# Patient Record
Sex: MALE | Race: WHITE | NOT HISPANIC OR LATINO | Employment: FULL TIME | URBAN - METROPOLITAN AREA
[De-identification: names, ages, dates, MRNs, and addresses within clinical notes are randomized per-mention and may not be internally consistent; named-entity substitution may affect disease eponyms.]

---

## 2018-01-04 LAB
INFLUENZA A ANTIGEN, POC: NEGATIVE
INFLUENZA B ANTIGEN, POC: NEGATIVE
RAPID GROUP A STREP (OHS): POSITIVE

## 2022-02-20 ENCOUNTER — HISTORICAL (OUTPATIENT)
Dept: ADMINISTRATIVE | Facility: HOSPITAL | Age: 57
End: 2022-02-20

## 2022-02-20 ENCOUNTER — HOSPITAL ENCOUNTER (OUTPATIENT)
Dept: MEDSURG UNIT | Facility: HOSPITAL | Age: 57
End: 2022-02-23
Attending: STUDENT IN AN ORGANIZED HEALTH CARE EDUCATION/TRAINING PROGRAM | Admitting: STUDENT IN AN ORGANIZED HEALTH CARE EDUCATION/TRAINING PROGRAM

## 2022-02-20 LAB
ABS NEUT (OLG): 3.24 (ref 2.1–9.2)
ALBUMIN SERPL-MCNC: 3.3 G/DL (ref 3.5–5)
ALBUMIN/GLOB SERPL: 1 {RATIO} (ref 1.1–2)
ALP SERPL-CCNC: 59 U/L (ref 40–150)
ALT SERPL-CCNC: 20 U/L (ref 0–55)
AMPHET UR QL SCN: NEGATIVE
APPEARANCE, UA: CLEAR
APTT PPP: 31 S (ref 23.3–37)
AST SERPL-CCNC: 24 U/L (ref 5–34)
BACTERIA SPEC CULT: NORMAL
BARBITURATE SCN PRESENT UR: NEGATIVE
BASOPHILS # BLD AUTO: 0 10*3/UL (ref 0–0.2)
BASOPHILS NFR BLD AUTO: 0 %
BENZODIAZ UR QL SCN: POSITIVE
BILIRUB SERPL-MCNC: 0.7 MG/DL
BILIRUB UR QL STRIP: NEGATIVE
BILIRUBIN DIRECT+TOT PNL SERPL-MCNC: 0.3 (ref 0–0.5)
BILIRUBIN DIRECT+TOT PNL SERPL-MCNC: 0.4 (ref 0–0.8)
BUN SERPL-MCNC: 17.5 MG/DL (ref 8.4–25.7)
CALCIUM SERPL-MCNC: 8.9 MG/DL (ref 8.7–10.5)
CANNABINOIDS UR QL SCN: POSITIVE
CBG: 127 (ref 70–115)
CBG: 163 (ref 70–115)
CBG: 201 (ref 70–115)
CHLORIDE SERPL-SCNC: 106 MMOL/L (ref 98–107)
CO2 SERPL-SCNC: 24 MMOL/L (ref 22–29)
COCAINE UR QL SCN: NEGATIVE
COLOR UR: YELLOW
CREAT SERPL-MCNC: 0.79 MG/DL (ref 0.73–1.18)
EOSINOPHIL # BLD AUTO: 0.1 10*3/UL (ref 0–0.9)
EOSINOPHIL NFR BLD AUTO: 2 %
ERYTHROCYTE [DISTWIDTH] IN BLOOD BY AUTOMATED COUNT: 13.6 % (ref 11.5–14.5)
EST. AVERAGE GLUCOSE BLD GHB EST-MCNC: 148.5 MG/DL
FENTANYL UR QL SCN: NEGATIVE
FERRITIN SERPL-MCNC: 25.45 NG/ML (ref 21.81–274.66)
FLAG2 (OHS): 80
FLAG3 (OHS): 100
FLAGS (OHS): 90
GLOBULIN SER-MCNC: 3.2 G/DL (ref 2.4–3.5)
GLUCOSE (UA): NORMAL
GLUCOSE SERPL-MCNC: 145 MG/DL (ref 74–100)
HBA1C MFR BLD: 6.8 %
HCT VFR BLD AUTO: 20.7 % (ref 40–51)
HEMOLYSIS INTERF INDEX SERPL-ACNC: 6
HGB BLD-MCNC: 8.1 G/DL (ref 13.5–17.5)
HGB UR QL STRIP: NEGATIVE
HIV 1+2 AB+HIV1 P24 AG SERPL QL IA: 0.06
HIV 1+2 AB+HIV1 P24 AG SERPL QL IA: NONREACTIVE
HYALINE CASTS #/AREA URNS LPF: NORMAL /[LPF]
ICTERIC INTERF INDEX SERPL-ACNC: 1
IMM GRANULOCYTES # BLD AUTO: 0.06 10*3/UL
IMM GRANULOCYTES NFR BLD AUTO: 1 %
IMM. NE 1 SUSPECT FLAG (OHS): 10
IMM. NE 2 SUSPECT FLAG (OHS): 20
INR PPP: 1.29 (ref 0.9–1.2)
IRON SATN MFR SERPL: 6 % (ref 20–50)
IRON SERPL-MCNC: 16 UG/DL (ref 65–175)
KETONES UR QL STRIP: NORMAL
LEUKOCYTE ESTERASE UR QL STRIP: NEGATIVE
LIPASE SERPL-CCNC: 41 U/L
LIPEMIC INTERF INDEX SERPL-ACNC: 6
LOW EVENT # SUSPECT FLAG (OHS): 70
LYMPHOCYTES # BLD AUTO: 0.6 10*3/UL (ref 0.6–4.6)
LYMPHOCYTES NFR BLD AUTO: 13 %
MANUAL DIFF? (OHS): NO
MCH RBC QN AUTO: 32.5 PG (ref 26–34)
MCHC RBC AUTO-ENTMCNC: 39.1 G/DL (ref 31–37)
MCV RBC AUTO: 83.1 FL (ref 80–100)
MDMA UR QL SCN: NEGATIVE
MO BLASTS SUSPECT FLAG (OHS): 40
MONOCYTES # BLD AUTO: 0.4 10*3/UL (ref 0.1–1.3)
MONOCYTES NFR BLD AUTO: 9 %
MUCOUS THREADS URNS QL MICRO: NORMAL
NEUTROPHILS # BLD AUTO: 3.24 10*3/UL (ref 2.1–9.2)
NEUTROPHILS NFR BLD AUTO: 74 %
NITRITE UR QL STRIP: NEGATIVE
NRBC BLD AUTO-RTO: 0 % (ref 0–0.2)
OPIATES UR QL SCN: NEGATIVE
PCP UR QL: NEGATIVE
PH UR STRIP.AUTO: 5.5 [PH] (ref 3–11)
PH UR STRIP: 5.5 [PH] (ref 4.5–8)
PLATELET # BLD AUTO: 128 10*3/UL (ref 130–400)
PLATELET CLUMPS SUSPECT FLAG (OHS): 220
PLATELETS.RETICULATED NFR BLD AUTO: 3.2 % (ref 0.9–11.2)
PMV BLD AUTO: 9.7 FL (ref 7.4–10.4)
POTASSIUM SERPL-SCNC: 3.6 MMOL/L (ref 3.5–5.1)
PROT SERPL-MCNC: 6.5 G/DL (ref 6.4–8.3)
PROT UR QL STRIP: NORMAL
PROTHROMBIN TIME: 15.9 S (ref 11.9–14.4)
RBC # BLD AUTO: 2.49 10*6/UL (ref 4.5–5.9)
RBC #/AREA URNS HPF: NORMAL /[HPF] (ref 0–5)
SODIUM SERPL-SCNC: 136 MMOL/L (ref 136–145)
SP GR UR STRIP: 1.03 (ref 1–1.03)
SQUAMOUS EPITHELIAL, UA: NORMAL
TIBC SERPL-MCNC: 265 UG/DL (ref 69–240)
TIBC SERPL-MCNC: 281 UG/DL (ref 250–450)
TRANSFERRIN SERPL-MCNC: 256 MG/DL (ref 174–364)
UROBILINOGEN UR STRIP-ACNC: NORMAL
WBC # SPEC AUTO: 4.4 10*3/UL (ref 4.5–11)
WBC #/AREA URNS HPF: NORMAL /[HPF] (ref 0–5)

## 2022-02-21 LAB
ABS NEUT (OLG): 1.75 (ref 2.1–9.2)
ALBUMIN SERPL-MCNC: 3 G/DL (ref 3.5–5)
ALBUMIN/GLOB SERPL: 1.2 {RATIO} (ref 1.1–2)
ALP SERPL-CCNC: 57 U/L (ref 40–150)
ALT SERPL-CCNC: 22 U/L (ref 0–55)
AST SERPL-CCNC: 23 U/L (ref 5–34)
BASOPHILS # BLD AUTO: 0 10*3/UL (ref 0–0.2)
BASOPHILS NFR BLD AUTO: 0 %
BILIRUB SERPL-MCNC: 0.6 MG/DL
BILIRUBIN DIRECT+TOT PNL SERPL-MCNC: 0.3 (ref 0–0.5)
BILIRUBIN DIRECT+TOT PNL SERPL-MCNC: 0.3 (ref 0–0.8)
BUN SERPL-MCNC: 14.7 MG/DL (ref 8.4–25.7)
CALCIUM SERPL-MCNC: 7.8 MG/DL (ref 8.7–10.5)
CBG: 133 (ref 70–115)
CBG: 142 (ref 70–115)
CBG: 145 (ref 70–115)
CBG: 234 (ref 70–115)
CHLORIDE SERPL-SCNC: 107 MMOL/L (ref 98–107)
CO2 SERPL-SCNC: 25 MMOL/L (ref 22–29)
CREAT SERPL-MCNC: 0.79 MG/DL (ref 0.73–1.18)
EOSINOPHIL # BLD AUTO: 0 10*3/UL (ref 0–0.9)
EOSINOPHIL NFR BLD AUTO: 2 %
ERYTHROCYTE [DISTWIDTH] IN BLOOD BY AUTOMATED COUNT: 13.9 % (ref 11.5–14.5)
FLAG2 (OHS): 70
FLAG3 (OHS): 90
FLAGS (OHS): 80
GLOBULIN SER-MCNC: 2.5 G/DL (ref 2.4–3.5)
GLUCOSE SERPL-MCNC: 130 MG/DL (ref 74–100)
HCT VFR BLD AUTO: 19.4 % (ref 40–51)
HEMOLYSIS INTERF INDEX SERPL-ACNC: 2
HGB BLD-MCNC: 7.1 G/DL (ref 13.5–17.5)
ICTERIC INTERF INDEX SERPL-ACNC: 1
IMM GRANULOCYTES # BLD AUTO: 0.03 10*3/UL
IMM GRANULOCYTES NFR BLD AUTO: 1 %
IMM. NE 1 SUSPECT FLAG (OHS): 10
IMM. NE 2 SUSPECT FLAG (OHS): 30
LIPEMIC INTERF INDEX SERPL-ACNC: <0
LOW EVENT # SUSPECT FLAG (OHS): 80
LYMPHOCYTES # BLD AUTO: 0.4 10*3/UL (ref 0.6–4.6)
LYMPHOCYTES NFR BLD AUTO: 17 %
MANUAL DIFF? (OHS): NO
MCH RBC QN AUTO: 31.4 PG (ref 26–34)
MCHC RBC AUTO-ENTMCNC: 36.6 G/DL (ref 31–37)
MCV RBC AUTO: 85.8 FL (ref 80–100)
MO BLASTS SUSPECT FLAG (OHS): 40
MONOCYTES # BLD AUTO: 0.2 10*3/UL (ref 0.1–1.3)
MONOCYTES NFR BLD AUTO: 10 %
NEUTROPHILS # BLD AUTO: 1.75 10*3/UL (ref 2.1–9.2)
NEUTROPHILS NFR BLD AUTO: 70 %
NRBC BLD AUTO-RTO: 0 % (ref 0–0.2)
PLATELET # BLD AUTO: 105 10*3/UL (ref 130–400)
PLATELET CLUMPS SUSPECT FLAG (OHS): 120
PMV BLD AUTO: 9.9 FL (ref 7.4–10.4)
POTASSIUM SERPL-SCNC: 3.7 MMOL/L (ref 3.5–5.1)
PROT SERPL-MCNC: 5.5 G/DL (ref 6.4–8.3)
RBC # BLD AUTO: 2.26 10*6/UL (ref 4.5–5.9)
SODIUM SERPL-SCNC: 136 MMOL/L (ref 136–145)
WBC # SPEC AUTO: 2.5 10*3/UL (ref 4.5–11)

## 2022-02-22 LAB
ABS NEUT (OLG): 2.57 (ref 2.1–9.2)
ALBUMIN SERPL-MCNC: 3.2 G/DL (ref 3.5–5)
ALBUMIN/GLOB SERPL: 1.2 {RATIO} (ref 1.1–2)
ALP SERPL-CCNC: 65 U/L (ref 40–150)
ALT SERPL-CCNC: 23 U/L (ref 0–55)
AST SERPL-CCNC: 30 U/L (ref 5–34)
BASOPHILS # BLD AUTO: 0 10*3/UL (ref 0–0.2)
BASOPHILS NFR BLD AUTO: 1 %
BILIRUB SERPL-MCNC: 1.1 MG/DL
BILIRUBIN DIRECT+TOT PNL SERPL-MCNC: 0.4 (ref 0–0.5)
BILIRUBIN DIRECT+TOT PNL SERPL-MCNC: 0.7 (ref 0–0.8)
BUN SERPL-MCNC: 9.3 MG/DL (ref 8.4–25.7)
CALCIUM SERPL-MCNC: 8.1 MG/DL (ref 8.7–10.5)
CBG: 121 (ref 70–115)
CBG: 123 (ref 70–115)
CBG: 126 (ref 70–115)
CBG: 132 (ref 70–115)
CBG: 150 (ref 70–115)
CHLORIDE SERPL-SCNC: 106 MMOL/L (ref 98–107)
CO2 SERPL-SCNC: 24 MMOL/L (ref 22–29)
CREAT SERPL-MCNC: 0.82 MG/DL (ref 0.73–1.18)
EOSINOPHIL # BLD AUTO: 0.1 10*3/UL (ref 0–0.9)
EOSINOPHIL NFR BLD AUTO: 2 %
ERYTHROCYTE [DISTWIDTH] IN BLOOD BY AUTOMATED COUNT: 14.2 % (ref 11.5–14.5)
FLAG2 (OHS): 70
FLAG3 (OHS): 90
FLAGS (OHS): 90
GLOBULIN SER-MCNC: 2.6 G/DL (ref 2.4–3.5)
GLUCOSE SERPL-MCNC: 130 MG/DL (ref 74–100)
HCT VFR BLD AUTO: 26.8 % (ref 40–51)
HEMOLYSIS INTERF INDEX SERPL-ACNC: 2
HGB BLD-MCNC: 9.2 G/DL (ref 13.5–17.5)
ICTERIC INTERF INDEX SERPL-ACNC: 1
IMM GRANULOCYTES # BLD AUTO: 0.04 10*3/UL
IMM GRANULOCYTES NFR BLD AUTO: 1 %
IMM. NE 1 SUSPECT FLAG (OHS): 10
IMM. NE 2 SUSPECT FLAG (OHS): 20
LIPEMIC INTERF INDEX SERPL-ACNC: <0
LOW EVENT # SUSPECT FLAG (OHS): 90
LYMPHOCYTES # BLD AUTO: 0.6 10*3/UL (ref 0.6–4.6)
LYMPHOCYTES NFR BLD AUTO: 15 %
MANUAL DIFF? (OHS): NO
MCH RBC QN AUTO: 28.2 PG (ref 26–34)
MCHC RBC AUTO-ENTMCNC: 34.3 G/DL (ref 31–37)
MCV RBC AUTO: 82.2 FL (ref 80–100)
MO BLASTS SUSPECT FLAG (OHS): 40
MONOCYTES # BLD AUTO: 0.4 10*3/UL (ref 0.1–1.3)
MONOCYTES NFR BLD AUTO: 10 %
NEUTROPHILS # BLD AUTO: 2.57 10*3/UL (ref 2.1–9.2)
NEUTROPHILS NFR BLD AUTO: 71 %
NRBC BLD AUTO-RTO: 0 % (ref 0–0.2)
PLATELET # BLD AUTO: 118 10*3/UL (ref 130–400)
PLATELET CLUMPS SUSPECT FLAG (OHS): 130
PLATELETS.RETICULATED NFR BLD AUTO: 3.2 % (ref 0.9–11.2)
PMV BLD AUTO: 10 FL (ref 7.4–10.4)
POTASSIUM SERPL-SCNC: 3.8 MMOL/L (ref 3.5–5.1)
PROT SERPL-MCNC: 5.8 G/DL (ref 6.4–8.3)
RBC # BLD AUTO: 3.26 10*6/UL (ref 4.5–5.9)
SODIUM SERPL-SCNC: 135 MMOL/L (ref 136–145)
WBC # SPEC AUTO: 3.6 10*3/UL (ref 4.5–11)

## 2022-02-23 LAB
ABS NEUT (OLG): 2.16 (ref 2.1–9.2)
ALBUMIN SERPL-MCNC: 3.3 G/DL (ref 3.5–5)
ALBUMIN/GLOB SERPL: 1.2 {RATIO} (ref 1.1–2)
ALP SERPL-CCNC: 68 U/L (ref 40–150)
ALT SERPL-CCNC: 25 U/L (ref 0–55)
AST SERPL-CCNC: 33 U/L (ref 5–34)
BASOPHILS # BLD AUTO: 0 10*3/UL (ref 0–0.2)
BASOPHILS NFR BLD AUTO: 1 %
BILIRUB SERPL-MCNC: 1.2 MG/DL
BILIRUBIN DIRECT+TOT PNL SERPL-MCNC: 0.5 (ref 0–0.5)
BILIRUBIN DIRECT+TOT PNL SERPL-MCNC: 0.7 (ref 0–0.8)
BUN SERPL-MCNC: 10.5 MG/DL (ref 8.4–25.7)
CALCIUM SERPL-MCNC: 8.2 MG/DL (ref 8.7–10.5)
CBG: 122 (ref 70–115)
CBG: 193 (ref 70–115)
CHLORIDE SERPL-SCNC: 106 MMOL/L (ref 98–107)
CO2 SERPL-SCNC: 22 MMOL/L (ref 22–29)
CREAT SERPL-MCNC: 0.87 MG/DL (ref 0.73–1.18)
EOSINOPHIL # BLD AUTO: 0.1 10*3/UL (ref 0–0.9)
EOSINOPHIL NFR BLD AUTO: 2 %
ERYTHROCYTE [DISTWIDTH] IN BLOOD BY AUTOMATED COUNT: 14.3 % (ref 11.5–14.5)
FLAG2 (OHS): 70
FLAG3 (OHS): 90
FLAGS (OHS): 80
FOLATE SERPL-MCNC: 10.6 NG/ML (ref 7–31.4)
GLOBULIN SER-MCNC: 2.7 G/DL (ref 2.4–3.5)
GLUCOSE SERPL-MCNC: 131 MG/DL (ref 74–100)
HCT VFR BLD AUTO: 26.7 % (ref 40–51)
HEMOLYSIS INTERF INDEX SERPL-ACNC: <0
HGB BLD-MCNC: 9.7 G/DL (ref 13.5–17.5)
ICTERIC INTERF INDEX SERPL-ACNC: 1
IMM GRANULOCYTES # BLD AUTO: 0.04 10*3/UL
IMM GRANULOCYTES NFR BLD AUTO: 1 %
IMM. NE 1 SUSPECT FLAG (OHS): 10
IMM. NE 2 SUSPECT FLAG (OHS): 40
LDH SERPL-CCNC: 184 U/L (ref 140–271)
LIPEMIC INTERF INDEX SERPL-ACNC: <0
LOW EVENT # SUSPECT FLAG (OHS): 80
LYMPHOCYTES # BLD AUTO: 0.5 10*3/UL (ref 0.6–4.6)
LYMPHOCYTES NFR BLD AUTO: 16 %
MANUAL DIFF? (OHS): NO
MCH RBC QN AUTO: 30.7 PG (ref 26–34)
MCHC RBC AUTO-ENTMCNC: 36.3 G/DL (ref 31–37)
MCV RBC AUTO: 84.5 FL (ref 80–100)
MO BLASTS SUSPECT FLAG (OHS): 40
MONOCYTES # BLD AUTO: 0.4 10*3/UL (ref 0.1–1.3)
MONOCYTES NFR BLD AUTO: 11 %
NEUTROPHILS # BLD AUTO: 2.16 10*3/UL (ref 2.1–9.2)
NEUTROPHILS NFR BLD AUTO: 69 %
NRBC BLD AUTO-RTO: 0 % (ref 0–0.2)
PLATELET # BLD AUTO: 120 10*3/UL (ref 130–400)
PLATELET CLUMPS SUSPECT FLAG (OHS): 210
PLATELETS.RETICULATED NFR BLD AUTO: 2.5 % (ref 0.9–11.2)
PMV BLD AUTO: 10.4 FL (ref 7.4–10.4)
POTASSIUM SERPL-SCNC: 3.9 MMOL/L (ref 3.5–5.1)
PROT SERPL-MCNC: 6 G/DL (ref 6.4–8.3)
RBC # BLD AUTO: 3.16 10*6/UL (ref 4.5–5.9)
RET# (OHS): 0.16 (ref 0.02–0.09)
RETICULOCYTE COUNT AUTOMATED (OLG): 5 (ref 0.5–1.5)
SODIUM SERPL-SCNC: 135 MMOL/L (ref 136–145)
VIT B12 SERPL-MCNC: 1004 PG/ML (ref 213–816)
WBC # SPEC AUTO: 3.1 10*3/UL (ref 4.5–11)

## 2022-03-28 ENCOUNTER — HISTORICAL (OUTPATIENT)
Dept: ENDOSCOPY | Facility: HOSPITAL | Age: 57
End: 2022-03-28

## 2022-03-28 LAB
CBG: 138 (ref 70–115)
CBG: 193 (ref 70–115)

## 2022-04-07 ENCOUNTER — HISTORICAL (OUTPATIENT)
Dept: ADMINISTRATIVE | Facility: HOSPITAL | Age: 57
End: 2022-04-07

## 2022-04-24 VITALS
HEIGHT: 69 IN | DIASTOLIC BLOOD PRESSURE: 88 MMHG | WEIGHT: 196.19 LBS | SYSTOLIC BLOOD PRESSURE: 133 MMHG | BODY MASS INDEX: 29.06 KG/M2 | OXYGEN SATURATION: 96 %

## 2022-04-27 RX ORDER — METFORMIN HYDROCHLORIDE 850 MG/1
850 TABLET ORAL 2 TIMES DAILY WITH MEALS
COMMUNITY
End: 2022-12-05 | Stop reason: CLARIF

## 2022-04-27 RX ORDER — PANTOPRAZOLE SODIUM 40 MG/1
TABLET, DELAYED RELEASE ORAL DAILY
COMMUNITY
End: 2022-12-05

## 2022-04-27 RX ORDER — CARVEDILOL 3.12 MG/1
6.25 TABLET ORAL 2 TIMES DAILY
COMMUNITY
End: 2022-06-02 | Stop reason: SDUPTHER

## 2022-04-27 RX ORDER — DIPHENHYDRAMINE HCL 25 MG
25 CAPSULE ORAL NIGHTLY PRN
COMMUNITY

## 2022-04-27 RX ORDER — ALPRAZOLAM 1 MG/1
1 TABLET ORAL 3 TIMES DAILY PRN
COMMUNITY
End: 2022-12-05 | Stop reason: CLARIF

## 2022-05-02 ENCOUNTER — ANESTHESIA (OUTPATIENT)
Dept: ENDOSCOPY | Facility: HOSPITAL | Age: 57
End: 2022-05-02

## 2022-05-02 ENCOUNTER — HOSPITAL ENCOUNTER (OUTPATIENT)
Facility: HOSPITAL | Age: 57
Discharge: HOME | End: 2022-05-02
Attending: INTERNAL MEDICINE | Admitting: INTERNAL MEDICINE

## 2022-05-02 ENCOUNTER — ANESTHESIA EVENT (OUTPATIENT)
Dept: ENDOSCOPY | Facility: HOSPITAL | Age: 57
End: 2022-05-02

## 2022-05-02 VITALS
WEIGHT: 174.63 LBS | DIASTOLIC BLOOD PRESSURE: 92 MMHG | BODY MASS INDEX: 25.86 KG/M2 | HEIGHT: 69 IN | TEMPERATURE: 98 F | HEART RATE: 72 BPM | RESPIRATION RATE: 20 BRPM | OXYGEN SATURATION: 100 % | SYSTOLIC BLOOD PRESSURE: 141 MMHG

## 2022-05-02 DIAGNOSIS — I85.10 SECONDARY ESOPHAGEAL VARICES WITHOUT BLEEDING: Primary | ICD-10-CM

## 2022-05-02 PROBLEM — I85.00 ESOPHAGEAL VARICES WITHOUT BLEEDING: Status: ACTIVE | Noted: 2022-05-02

## 2022-05-02 LAB
CTP QC/QA: YES
POCT GLUCOSE: 150 MG/DL (ref 70–110)
SARS-COV-2 AG RESP QL IA.RAPID: NEGATIVE

## 2022-05-02 PROCEDURE — 37000008 HC ANESTHESIA 1ST 15 MINUTES: Performed by: INTERNAL MEDICINE

## 2022-05-02 PROCEDURE — 37000009 HC ANESTHESIA EA ADD 15 MINS: Performed by: INTERNAL MEDICINE

## 2022-05-02 PROCEDURE — 63600175 PHARM REV CODE 636 W HCPCS: Performed by: ANESTHESIOLOGY

## 2022-05-02 PROCEDURE — 43244 EGD VARICES LIGATION: CPT | Performed by: INTERNAL MEDICINE

## 2022-05-02 PROCEDURE — 27201423 OPTIME MED/SURG SUP & DEVICES STERILE SUPPLY: Performed by: INTERNAL MEDICINE

## 2022-05-02 RX ORDER — LIDOCAINE HYDROCHLORIDE 10 MG/ML
1 INJECTION, SOLUTION EPIDURAL; INFILTRATION; INTRACAUDAL; PERINEURAL ONCE
Status: CANCELLED | OUTPATIENT
Start: 2022-05-02 | End: 2022-05-02

## 2022-05-02 RX ORDER — SODIUM CHLORIDE, SODIUM LACTATE, POTASSIUM CHLORIDE, CALCIUM CHLORIDE 600; 310; 30; 20 MG/100ML; MG/100ML; MG/100ML; MG/100ML
INJECTION, SOLUTION INTRAVENOUS CONTINUOUS
Status: DISCONTINUED | OUTPATIENT
Start: 2022-05-02 | End: 2022-05-02 | Stop reason: HOSPADM

## 2022-05-02 RX ADMIN — SODIUM CHLORIDE, POTASSIUM CHLORIDE, SODIUM LACTATE AND CALCIUM CHLORIDE: 600; 310; 30; 20 INJECTION, SOLUTION INTRAVENOUS at 09:05

## 2022-05-02 NOTE — PROVATION PATIENT INSTRUCTIONS
Discharge Summary/Instructions after an Endoscopic Procedure  Patient Name: Ugo Weiner MRN: [id number]  Patient YOB: 1965  Monday, May 2, 2022  Sujata Kong MD  Dear patient,  As a result of recent federal legislation (The Federal Cures Act), you may   receive lab or pathology results from your procedure in your [facility]   account before your physician is able to contact you. Your physician or   their representative will relay the results to you with their   recommendations at their soonest availability.  Thank you,  RESTRICTIONS:  During your procedure today, you received medications for sedation.  These   medications may affect your judgment, balance and coordination.  Therefore,   for 24 hours, you have the following restrictions:   - DO NOT drive a car, operate machinery, make legal/financial decisions,   sign important papers or drink alcohol.    ACTIVITY:  Today: no heavy lifting, straining or running due to procedural   sedation/anesthesia.  The following day: return to full activity including work.  DIET:  Eat and drink normally unless instructed otherwise.     TREATMENT FOR COMMON SIDE EFFECTS:  - Mild abdominal pain, nausea, belching, bloating or excessive gas:  rest,   eat lightly and use a heating pad.  - Sore Throat: treat with throat lozenges and/or gargle with warm salt   water.  - Because air was used during the procedure, expelling large amounts of air   from your rectum or belching is normal.  - If a bowel prep was taken, you may not have a bowel movement for 1-3 days.    This is normal.  SYMPTOMS TO WATCH FOR AND REPORT TO YOUR PHYSICIAN:  1. Abdominal pain or bloating, other than gas cramps.  2. Chest pain.  3. Back pain.  4. Signs of infection such as: chills or fever occurring within 24 hours   after the procedure.  5. Rectal bleeding, which would show as bright red, maroon, or black stools.   (A tablespoon of blood from the rectum is not serious, especially  if   hemorrhoids are present.)  6. Vomiting.  7. Weakness or dizziness.  GO DIRECTLY TO THE NEAREST EMERGENCY ROOM IF YOU HAVE ANY OF THE FOLLOWING:      Difficulty breathing              Chills and/or fever over 101 F   Persistent vomiting and/or vomiting blood   Severe abdominal pain   Severe chest pain   Black, tarry stools   Bleeding- more than one tablespoon   Any other symptom or condition that you feel may need urgent attention  Your doctor recommends these additional instructions:  If any biopsies were taken, your doctors clinic will contact you in 1 to 2   weeks with any results.  - Patient has a contact number available for emergencies.  The signs and   symptoms of potential delayed complications were discussed with the   patient.  Return to normal activities tomorrow.  Written discharge   instructions were provided to the patient.   - Low sodium diet.   - Continue present medications.   - Repeat upper endoscopy in 1 month for retreatment.   - Discharge patient to home.  For questions, problems or results please call your physician - Sujata Kong MD at Work:  [phone].  [facility] , EMERGENCY ROOM PHONE NUMBER: [phone]  IF A COMPLICATION OR EMERGENCY SITUATION ARISES AND YOU ARE UNABLE TO REACH   YOUR PHYSICIAN - GO DIRECTLY TO THE EMERGENCY ROOM.  MD Sujata Juarez MD  5/2/2022 12:27:04 PM  This report has been verified and signed electronically.  Dear patient,  As a result of recent federal legislation (The Federal Cures Act), you may   receive lab or pathology results from your procedure in your [facility]   account before your physician is able to contact you. Your physician or   their representative will relay the results to you with their   recommendations at their soonest availability.  Thank you,  PROVATION

## 2022-05-02 NOTE — ANESTHESIA PREPROCEDURE EVALUATION
"                                                                                                             05/02/2022  Ugo Zimmerman is a 56 y.o., male.      Active Ambulatory Problems     Diagnosis Date Noted    No Active Ambulatory Problems     Resolved Ambulatory Problems     Diagnosis Date Noted    No Resolved Ambulatory Problems     Past Medical History:   Diagnosis Date    Diabetes mellitus     Unspecified viral hepatitis C without hepatic coma        Pre-op Assessment    I have reviewed the NPO Status.      Review of Systems  Anesthesia Hx:  No problems with previous Anesthesia    Social:  Non-Smoker    Cardiovascular:   Hypertension    Pulmonary:  Pulmonary Normal    Renal/:  Renal/ Normal     Hepatic/GI:   Liver Disease, Hepatitis, C    Neurological:  Neurology Normal    Endocrine:   Diabetes, type 2      Vitals:    04/27/22 1134 05/02/22 0929   BP:  119/77   BP Location:  Left arm   Patient Position:  Lying   Pulse:  69   Resp:  18   Temp:  36.7 °C (98.1 °F)   TempSrc:  Oral   SpO2:  97%   Weight: 81 kg (178 lb 9.2 oz) 79.2 kg (174 lb 9.7 oz)   Height: 5' 9" (1.753 m) 5' 9" (1.753 m)         Physical Exam  General: Well nourished, Cooperative and Alert    Airway:  Mallampati: II   Mouth Opening: Normal  TM Distance: Normal  Tongue: Normal  Neck ROM: Normal ROM    Dental:  Intact    Chest/Lungs:  Clear to auscultation, Normal Respiratory Rate    Heart:  Rate: Normal  Rhythm: Regular Rhythm          Anesthesia Plan  Type of Anesthesia, risks & benefits discussed:    Anesthesia Type: Gen Natural Airway  Intra-op Monitoring Plan: Standard ASA Monitors  Induction:  IV  Informed Consent: Informed consent signed with the Patient and all parties understand the risks and agree with anesthesia plan.  All questions answered.   ASA Score: 3  Day of Surgery Review of History & Physical: H&P Update referred to the surgeon/provider.    Ready For Surgery From Anesthesia Perspective.     .      "

## 2022-05-02 NOTE — H&P
"Chief complaint: Esophageal Varices      HPI: Ugo Zimmerman is a 57 yo M with a PMH of DM, hepatitis C (treated), and cirrhosis who recently experienced an UGI bleed in February. He underwent EGD with variceal banding on 2/22/22. He again underwent variceal banding on 3/28/22, where no active bleeding was noted. He did have portal hypertensive gastropathy at that time. Denies any recent emesis, hematemesis, abdominal pain, or melena. He does have occasional nausea. NPO since midnight.    Review of Systems   Constitutional: Negative.  Negative for appetite change and diaphoresis.   HENT: Negative.  Negative for congestion, ear pain, sneezing and sore throat.    Eyes: Negative.    Respiratory: Negative.  Negative for choking, chest tightness and shortness of breath.    Cardiovascular: Negative.    Gastrointestinal: Positive for nausea. Negative for abdominal distention, abdominal pain, blood in stool, diarrhea and vomiting.   Endocrine: Negative.    Genitourinary: Negative.  Negative for flank pain, scrotal swelling and testicular pain.   Musculoskeletal: Negative.    Skin: Negative.    Neurological: Negative.    Hematological: Negative.          Past Medical History:   Diagnosis Date    Diabetes mellitus     Unspecified viral hepatitis C without hepatic coma       Past Surgical History:   Procedure Laterality Date    CHOLECYSTECTOMY      ESOPHAGOGASTRODUODENOSCOPY  02/22/2022    with biopsy and bleeder control    ESOPHAGOGASTRODUODENOSCOPY  03/28/2022    wtih banding      Social History     Socioeconomic History    Marital status:    Tobacco Use    Smoking status: Never Smoker    Smokeless tobacco: Never Used   Substance and Sexual Activity    Alcohol use: Not Currently     Comment: quit 1999    Drug use: Yes     Frequency: 7.0 times per week     Types: Marijuana     Comment: "medical, daily"      Family History   Problem Relation Age of Onset    Cancer Mother     Cancer Brother       Review of " "patient's allergies indicates:  No Known Allergies   Prior to Admission medications    Medication Sig Start Date End Date Taking? Authorizing Provider   ALPRAZolam (XANAX) 1 MG tablet Take 1 mg by mouth 3 (three) times daily as needed for Anxiety.   Yes Historical Provider   carvediloL (COREG) 3.125 MG tablet Take 6.25 mg by mouth 2 (two) times daily.   Yes Historical Provider   diphenhydrAMINE (BENADRYL) 25 mg capsule Take 25 mg by mouth nightly as needed for Insomnia.   Yes Historical Provider   metFORMIN (GLUCOPHAGE) 850 MG tablet Take 850 mg by mouth 2 (two) times daily with meals.   Yes Historical Provider   pantoprazole (PROTONIX) 40 MG tablet Take by mouth once daily.   Yes Historical Provider            VITAL SIGNS: 24 HR MIN & MAX LAST    Temp  Min: 98.1 °F (36.7 °C)  Max: 98.1 °F (36.7 °C)  98.1 °F (36.7 °C)        BP  Min: 119/77  Max: 119/77  119/77     Pulse  Min: 69  Max: 69  69     Resp  Min: 18  Max: 18  18    SpO2  Min: 97 %  Max: 97 %  97 %      HT: 5' 9" (175.3 cm)  WT: 79.2 kg (174 lb 9.7 oz)  BMI: 25.8     PHYSICAL EXAM  Gen: No acute distress  Neck: Trachea midline  Chest: Equal chest rise  Heart: Regular rate  Abdomen: soft, NT, ND  : No blood at meatus  Extremities: Full ROM to all four extremities    Assessment/Plan:  57 yo M with a PMH of DM, hepatitis C (treated), and cirrhosis who recently experienced an UGI bleed in February. He underwent EGD with variceal banding on 2/22/22. He again underwent variceal banding on 3/28/22, where no active bleeding was noted.    - To GI lab for surveillance endoscopy  - Risks, benefits, alternatives discussed with patient    Abdirashid Arellano MD  General Surgery HO2  "

## 2022-05-09 NOTE — ANESTHESIA POSTPROCEDURE EVALUATION
Anesthesia Post Evaluation    Patient: Ugo Zimmerman    Procedure(s) Performed: Procedure(s) (LRB):  EGD, WITH BANDING OF VARICES (N/A)    Final Anesthesia Type: general      Patient location during evaluation: PACU  Post-procedure vital signs: reviewed and stable  Airway patency: patent      Anesthetic complications: no      Cardiovascular status: hemodynamically stable  Respiratory status: spontaneous ventilation  Follow-up not needed.          Vitals Value Taken Time   /92 05/02/22 1313   Temp  05/09/22 1245   Pulse 72 05/02/22 1313   Resp 20 05/02/22 1313   SpO2 100 % 05/02/22 1313         No case tracking events are documented in the log.      Pain/Alexander Score: No data recorded

## 2022-05-14 NOTE — DISCHARGE SUMMARY
Admit and Discharge Dates  Admit Date: 02/20/2022  Discharge Date: 02/23/2022???????????????????????????????????????  Physicians  Attending Physician - Mike CAI, Jeffery  Admitting Physician - Mike CAI, Jeffery  Consulting Physician - Dilan CONDE, Sujata PENG  Primary Care Physician - Astrid CONDE , Navdeep  Discharge Diagnosis  1.?Cirrhosis?K74.60  2.?GI bleed?K92.2  3.?Symptomatic anemia?D64.9  4.?Diabetes mellitus?E11.9  Blood in stool?[id number]  Surgical Procedures  02/22/2022 - [id number] - Esophagogastroduodenoscopy  02/22/2022 - [id number] - Bleeder Control Gastrointestinal  02/22/2022 - [id number] - Biopsy Gastrointestinal  Immunizations  No immunizations recorded for this visit.  Admission Information  ?56-year-old male with PMH significant for DM 2, treated hep C, cirrhotic liver disease who presented to [facility] ED with?generalized weakness, fatigue, melena and coffee-ground emesis for several weeks. ?Patient states that approximately 2-3 weeks ago he began to experience dark-colored stools with intermittent constipation and diarrhea.??He states that approximately 2-3 days ago he began to experience coffee-ground emesis with one large volume dark-colored vomit.?Pt recently taking 2-3?ibuprofen 3 or 4 times a day every day for several weeks due to headache.  ?  Pt with treated hep C treated with ribavirin, and then Epclusa after reoccurrence.?At time of admission: CBC was significant for H&H 8.1/20.7, platelets 128. ?[facility] internal medicine services consulted for GI bleed vs symptomatic anemia.  Hospital Course  Pt admitted for symptomatic anemia secondary to GI bleed. pt received intravenous fluids and transfusion of 2 units PRBCs. Pt evaluated by GI who recommended supplemental IV iron and octreotide drip. Pt underwent EGD which showed large esophageal varices that were banded. Pt tolerated procedure well. Diet advanced. Pt eating and feeling much improved. Pt with?intermittent  headache during admission that resolved?prior to discharge.?Pt and wife decline new medication at this time.  Referral placed to Hematology clinic for further evaluation of pancytopenia. Instructed to follow with PCP for any continued headache.  Discharge patient with Coreg 6.25 BID and Protonix daily as recommended by GI. Will follow with GI as scheduled in 1 month.  Significant Findings  (02/20/2022 17:30 CST US Abdomen Complete)  Impression: 1. Cirrhosis 2. Patent portal vein 3. Splenomegaly 4. Trace ascites. 5. Questionable nonobstructing nephrolithiasis right kidney.  ?  (02/20/2022 16:06 CST CT Head W/O Contrast)  IMPRESSION: No acute intracranial finding  Time Spent on discharge  >30 mins  Objective  Vitals & Measurements  T:?37.2? ?C (Oral)? TMIN:?36.7? ?C (Oral)? TMAX:?37.2? ?C (Oral)? HR:?79(Peripheral)? HR:?80(Monitored)? RR:?16? BP:?124/78? SpO2:?95%?  Physical Exam  General:?Alert and oriented,?no acute distress  Respiratory:?Lungs clear to auscultation bilaterally,?No increased work of breathing  Cardiovascular:?S1-S2, regular rate, regular rhythm,?2+ radial pulses,?No lower extremity edema  Gastrointestinal:?Soft, nontender, nondistended  Musculoskeletal: Appropriate movement of extremities bilaterally  Integumentary: No rashes or breakdown noted  Patient Discharge Condition  Stable  Discharge Disposition  Discharge home with follow up as listed with GI, PCP, and referral placed to Hematology.  Attending Physician Addendum  Patient was seen and examined on AM rounds. Management and plan were?discussed with resident. Care was reasonable and necessary.?   Discharge Medication Reconciliation  Prescribed  carvedilol (Coreg 3.125 mg oral tablet)?6.25 mg, Oral, BID  pantoprazole (Pantoprazole 40 mg ORAL EC-Tablet)?40 mg, Oral, Daily  Continue  alPRAzolam (Xanax 0.5 mg oral tablet)?1 mg, Oral, TID  diphenhydrAMINE (Benadryl Ultratab 25 mg oral tablet)?25 mg, Oral, Once a day (at bedtime), PRN for  insomnia  metFORMIN (metformin 850 mg oral tablet)?850 mg, Oral, Once  Discontinue  diphenhydrAMINE (Benadryl 50 mg oral tablet)?1 tab, Oral, At Bedtime  Education and Orders Provided  Esophageal Varices  Gastrointestinal Bleeding, Easy-to-Read  Discharge - 02/23/22 13:02:00 CST, Home?  Follow up  Navdeep Resendiz MD  ????Follow-up with PCP in 3 to 5 days  Sujata Kong  ????Keep scheduled appointment  Hematology Clinic     [1] CT Head W/O Contrast; Laura CONDE, Isaura Alba 02/20/2022 16:06 CST

## 2022-05-14 NOTE — H&P
Chief Complaint  Esophageal varices  History of Present Illness  Patient recently admitted for UGI bleed. He underwent EGD with banding of varices. He was discharge on ferrous sulfate 325mg every other day, coreg 6.25mg BID, and protonix 40mg once daily.?Since discharge he reports one episode of sharp epigastric/chest pain that came?unprovoked then?resolved spontaneously after <1hr. Denies hematemesis, decreased appetite, dysphagia.  Review of Systems  Negative except as above  Physical Exam  Vitals & Measurements  WT:?81.0?kg? BMI:?26.37?  Gen: well appearing, NAD  HEENT: normocephalic, atraumatic  CV: RRR  Pulm: no increased work of breathing, equal chest rise  Abd: soft, NT/ND  MSK: no diffuse rash, no injury or deformity  Neuro: no focal deficits, normal gait  Assessment/Plan  56-year-old male with PMH significant for DM 2, treated hep C, cirrhotic liver disease now s/p EGD with variceal banding 2/22 presenting for follow up EGD.  ?   - Consent obtained after discussion r/b/a  - Follow up pending findings  ?   Mary Iman PGY2   Problem List/Past Medical History  Ongoing  Diabetes mellitus  Hepatitis C  Historical  No qualifying data  Procedure/Surgical History  Biopsy Gastrointestinal (02/22/2022)  Bleeder Control Gastrointestinal (02/22/2022)  Esophagogastroduodenoscopy (02/22/2022)  Control Bleeding in Gastrointestinal Tract, Via Natural or Artificial Opening Endoscopic (02/20/2022)  Esophagogastroduodenoscopy, flexible, transoral; with band ligation of esophageal/gastric varices (02/20/2022)  Esophagogastroduodenoscopy, flexible, transoral; with biopsy, single or multiple (02/20/2022)  Excision of Duodenum, Via Natural or Artificial Opening Endoscopic, Diagnostic (02/20/2022)  Excision of Stomach, Via Natural or Artificial Opening Endoscopic, Diagnostic (02/20/2022)  Cholecystectomy   Medications  Inpatient  buffered lidocaine 2% - 0.5 ml syringe, 10 mg= 0.5 mL, Subcutaneous, As Directed  IVF Lactated  Ringers LR Infusion 1,000 mL, 1000 mL, IV  Home  Benadryl Ultratab 25 mg oral tablet, 25 mg= 1 tab(s), Oral, Once a day (at bedtime), PRN  Coreg 3.125 mg oral tablet, 6.25 mg= 2 tab(s), Oral, BID, 2 refills  metformin 850 mg oral tablet, 850 mg= 1 tab(s), Oral, Once  Pantoprazole 40 mg ORAL EC-Tablet, 40 mg= 1 tab(s), Oral, Daily, 2 refills  Xanax 0.5 mg oral tablet, 1 mg= 2 tab(s), Oral, TID  Allergies  No Known Allergies  Social History  Abuse/Neglect  No, 02/20/2022  Tobacco  Never (less than 100 in lifetime), No, 02/20/2022  Never smoker, 01/04/2018  Family History  Cancer: Mother and Brother.  Diabetes mellitus type 2: Father.  Diagnostic Results  Final Diagnosis?(Verified)  ?  1. ?Duodenum, Biopsy:  - Mild chronic duodenitis with reactive glandular changes.  ?  2. ?Stomach, Biopsy:  - Benign gastric mucosa with reactive glandular changes.  -?No Helicobacter pylori organisms identified on Diff-Quik stain, appropriate controls reviewed.? [1]     [1]?SURGICAL PATH FINAL REPORT; 02/22/2022 12:50 CST

## 2022-05-14 NOTE — H&P
Patient:   Ugo Zimmerman             MRN: [id number]            FIN: [id number]               Age:   56 years     Sex:  Male     :  1965   Associated Diagnoses:   None   Author:   Mundo Mosley DO      [facility] IM Admission H&P     SUBJECTIVE  History of Present Illness  Mr. Ugo Zimmerman is a 56-year-old male with PMHx significant for DM 2, treated hep C, cirrhotic liver disease who presents to [facility] ED with complaints of generalized weakness and fatigue as well as dark-colored stools and coffee-ground emesis for the past several weeks.  Patient states that approximately 2-3 weeks ago he began to experience dark-colored stools with intermittent constipation and diarrhea, denies bright red blood per rectum.  He states that approximately 2-3 days ago he began to experience coffee-ground emesis with one large volume dark-colored vomitus, once again denying bright red blood.  He states that he is also been experiencing subjective fevers, and generalized weakness and fatigue during this time.  Also reports decreased appetite over the past several weeks.  In regards to medications, patient states he takes his metformin 500 twice daily, Xanax 0.5 mg twice daily, but has also been taking ibuprofen more frequently due to headaches, states he is taking 2-3 pills 3 or 4 times a day every day for the past several weeks.    Of note, he has a history of treated hep C approximately 7 years ago, states he was treated here originally with ribavirin, but after recurrence was given Epclusa.  States that he had EGD and colonoscopy at age 49 which was benign with no significant findings.    In the ED, patient noted to be afebrile, normotensive, oxygenating well on room air.  No significant electrolyte abnormalities, AST/ALT, bilirubin all within normal limits.  CBC was significant for H&H 8.1/20.7, platelets 128.  [facility] internal medicine services consulted for GI bleed vs symptomatic anemia.     Past Medical History  DM2,  treated hep C, cirrhosis 2/2 hep C    Social History  Denies Hx of IVDU, reports periodic marijuana use, denies tobacco use, denies alcohol use or history of alcohol abuse  Lives at home with his wife, previous [employer] worker, able to perform all ADLs independently     Home Medications  Home Medications (4) Active  Benadryl 50 mg oral tablet 1 tab, Oral, At Bedtime  Benadryl Ultratab 25 mg oral tablet 25 mg = 1 tab(s), PRN, Oral, Once a day (at bedtime)  metformin 850 mg oral tablet 850 mg = 1 tab(s), Oral, Once  Xanax 0.5 mg oral tablet 1 mg = 2 tab(s), Oral, TID       Allergies  No Known Medication Allergies; No Known Allergies       Review of Systems  Constitutional: Subjective fevers, generalized weakness and fatigue  Respiratory: no cough, no wheezing, no shortness of breath at rest, reports intermittent GIBBONS  Cardiovascular: no chest pain, no palpitations, no edema  Gastrointestinal: Reports coffee colored emesis 2-3 days ago, denies hematochezia, denies abdominal pain, reports melanotic stools with intermittent constipation and diarrhea  Genitourinary: no dysuria, no urinary frequency or urgency, no hematuria  Hema/Lymph: no abnormal bruising or bleeding  Musculoskeletal: no muscle or joint pain, no joint swelling  Integumentary: no skin rash or abnormal lesion  Neurologic: Frequent and persistent headaches x1 month, denies focal deficits        OBJECTIVE  Vital Signs (last 24 hrs)_____  Last Charted___________  Temp Oral     37.2 DegC  (FEB 20 14:48)  Heart Rate Peripheral  84 bpm  (FEB 20 11:53)  Resp Rate         18 br/min  (FEB 20 14:48)  SBP      132 mmHg  (FEB 20 14:48)  DBP      89 mmHg  (FEB 20 14:48)  SpO2      100 %  (FEB 20 14:48)  Weight      80.2 kg  (FEB 20 11:53)  Height      172.7 cm  (FEB 20 11:53)  BMI    26.89  (FEB 20 11:53)     Physical Exam  Gen: well-nourished, well-developed in no acute distress  HEENT: Normocephalic, atraumatic.  Heart: RRR, no murmurs, gallops, clicks or rubs, no LE  edema  Lungs: CTAB without rales, wheezes or rhonchi. Normal work of breathing. Chest rise symmetrical on inspiration.  Abd: Soft, non-tender, obese and distended and without guarding, negative Teixeira's, negative fluid wave. No organomegaly. No obvious masses. Bowel sounds present.  Extremities: Radial and pedal pulses 2+ bilaterally, no LE edema.  MSK: No obvious deformities. Moves all extremities purposefully.  Neuro: AAO to person, place, time, situation, responds well to commands, answers all questions appropriately, no focal neuro deficits noted on exam  Skin: Warm, dry and without rashes, no significant jaundice, feet dry and clean without skin breakdown or ulcerations        Labs  Labs on presentation: , K3.6, , bicarb 24  Glucose 124  BUN/creatinine 17.5/0.79  Total bilirubin 0.7  AST/ALT 24/50  Lipase 41  WBC 4.4  H&H 8.1/20.7  Platelets 128  COVID-19 negative        Radiology  Pending CXR, CT head, ultrasound abdomen        ASSESSMENT & PLAN  Assessment  Symptomatic anemia  Melena  -H&H 8.1/20.7, typed and screened with transfusion for 2 units given by ED  -FOBT positive in ED, with patient reporting melanotic stools and coffee-ground emesis  -Patient hemodynamically stable, will continue IV Protonix  -Started on Rocephin in ED  -Patient reports his last EGD and colonoscopy were approximately 7 years ago, states results were benign.  Given his complaints over the past several weeks, may benefit from inpatient GI evaluation versus scope and evaluation outpatient  -Lengthy discussion as it relates to the need for cessation of ibuprofen/NSAIDs use  -Pending peripheral smear, iron profile    Cirrhosis 2/2 treated hepatitis C  -Ultrasound abdomen pending  -Ordering hepatitis panel with HCV viral load as well    DM  -Holding home metformin  -Pending A1c  -Sliding scale insulin while inpatient    Anxiety/depression  -Patient reports compliance with Xanax 0.5 twice daily, will continue  inpatient    Headaches  -Pending CT head  -Avoid NSAID use, continue supportive care     GI PPx: Protonix 40mg BID  DVT PPx: Lovenox 40mg   Diet: diabetic   IVF: NS 75 ml/hr x 1 additional bag      Mundo Mosley DO PGY2, [facility]

## 2022-05-21 NOTE — HISTORICAL [facility] CERNER
This is a historical note converted from [place]. Formatting and pictures may have been removed.  Please reference [place] for original formatting and attached multimedia. Subjective  Mr. Ugo Zimmerman is a 56 year old  male with a past medical history of hepatitis C cirrhosis (diagnosed 7 years ago), diabetes mellitus type 2 who presented to the emergency department with complaints of generalized weakness and fatigue, as well as about a 3 week history of melena and an episode of large volume coffee ground emesis about 4 days prior to admission.? He has extensive NSAID use over the past month due to headaches.? GI was consulted for evaluation.? Hemoglobin dropped from 8.1 -> 7.1, he was transfused 2 units pRBCs and given IV iron replacement x 1.? Initially was planned for EGD but was not NPO.  ?   Interval History  Patient had no acute events overnight.? No bowel movements reported since being in the hospital but vital signs have been stable.? He remained NPO after midnight.? Has not had any recurrent symptoms, still does report a slight headache and neck stiffness.? Plan for EGD today.  Review of Systems  12 point ROS negative unless otherwise stated above  Objective  Vitals & Measurements  T:?36.7? ?C (Oral)? TMIN:?36.3? ?C (Oral)? TMAX:?36.9? ?C (Oral)? HR:?85(Peripheral)? RR:?20? BP:?125/73? SpO2:?97%?  Physical Exam  Gen: He is alert and oriented x3. Well developed, well-nourished, NAD.  Head: Normocephalic  Eyes: PERRL, EOMI, no conjunctival injection or pallor, no scleral icterus  Nose: No nasal discharge  Throat: No pharyngeal inflammation, erythema, discharge, mucous membranes moist  Neck: Supple. No carotid bruits.? No lymphadenopathy or thyromegaly.  Lungs: Clear to auscultation bilaterally  CV: Normal S1 & S2, RRR, no murmurs appreciated  Abdomen: Soft, NT/ND, bowel sounds present.? No hepatosplenomegaly  Ext: No cyanosis/clubbing/edema, pulses 2+  Neuro:?CN II-XII grossly intact, strength 5/5  throughout, normal sensation  Psych: Flat affect, but denies suicidal or homicidal ideations  Skin: No rashes, lesions, ulceration or induration  Lab Results  Labs Last 24 Hours?  ?Chemistry? Hematology/Coagulation?   Sodium Lvl:?135 mmol/L?Low (02/22/22 03:11:00) WBC:?3.6 x10(3)/mcL?Low (02/22/22 03:11:00)   Potassium Lvl: 3.8 mmol/L (02/22/22 03:11:00) RBC:?3.26 x10(6)/mcL?Low (02/22/22 03:11:00)   Chloride: 106 mmol/L (02/22/22 03:11:00) Hgb:?9.2 gm/dL?Low (02/22/22 03:11:00)   CO2: 24 mmol/L (02/22/22 03:11:00) Hct:?26.8 %?Low (02/22/22 03:11:00)   Calcium Lvl:?8.1 mg/dL?Low (02/22/22 03:11:00) Platelet:?118 x10(3)/mcL?Low (02/22/22 03:11:00)   Glucose Lvl:?130 mg/dL?High (02/22/22 03:11:00) MCV: 82.2 fL (02/22/22 03:11:00)   BUN: 9.3 mg/dL (02/22/22 03:11:00) MCH: 28.2 pg (02/22/22 03:11:00)   Creatinine: 0.82 mg/dL (02/22/22 03:11:00) MCHC: 34.3 gm/dL (02/22/22 03:11:00)   Est Creat Clearance Ser: 96.13 mL/min (02/22/22 05:10:04) RDW: 14.2 % (02/22/22 03:11:00)   eGFR-AA: >105 (02/22/22 03:11:00) MPV: 10 fL (02/22/22 03:11:00)   eGFR-MARIA ANTONIA: 103 mL/min/1.73 m2 (02/22/22 03:11:00) Abs Neut: 2.57 x10(3)/mcL (02/22/22 03:11:00)   Bili Total: 1.1 mg/dL (02/22/22 03:11:00) Neutro Auto: 71 % (02/22/22 03:11:00)   Bili Direct: 0.4 mg/dL (02/22/22 03:11:00) Lymph Auto: 15 % (02/22/22 03:11:00)   Bili Indirect: 0.7 mg/dL (02/22/22 03:11:00) Mono Auto: 10 % (02/22/22 03:11:00)   AST: 30 unit/L (02/22/22 03:11:00) Eos Auto: 2 % (02/22/22 03:11:00)   ALT: 23 unit/L (02/22/22 03:11:00) Abs Eos: 0.1 x10(3)/mcL (02/22/22 03:11:00)   Alk Phos: 65 unit/L (02/22/22 03:11:00) Basophil Auto: 1 % (02/22/22 03:11:00)   Total Protein:?5.8 gm/dL?Low (02/22/22 03:11:00) Abs Neutro: 2.57 x10(3)/mcL (02/22/22 03:11:00)   Albumin Lvl:?3.2 gm/dL?Low (02/22/22 03:11:00) Abs Lymph: 0.6 x10(3)/mcL (02/22/22 03:11:00)   Globulin: 2.6 gm/dL (02/22/22 03:11:00) Abs Mono: 0.4 x10(3)/mcL (02/22/22 03:11:00)   A/G Ratio: 1.2 ratio (02/22/22 03:11:00)  Abs Baso: 0 x10(3)/mcL (02/22/22 03:11:00)    NRBC%: 0.0 (02/22/22 03:11:00)    IG%: 1 % (02/22/22 03:11:00)    IG#: 0.04 (02/22/22 03:11:00)   Assessment/Plan  Orders:  octreotide 500 mcg + Sodium Chloride 0.9% intravenous solution 500 mL, 500 mL, 500 mL, IV, 50 mL/hr, start date 02/22/22 10:16:00 CST, 1.94, m2  NPO After Midnight, 02/22/22 0:01:00 CST, CM NPO  Cirrhosis  Large esophageal varices  Due to HCV  MELD-Na score of 12, 90 day mortality <2%  Child Chaudhry Class A, only trace ascites seen on ultrasound  Currently not on any medications, does not appear fluid overloaded  EGD today to rule out variceal bleed given patients EDWARD, was NPO after midnight  Last EGD was 7 years ago which was normal per patient, Last Colonoscopy also was 7 years ago and normal  EGD reveals large varices which were banded, see report for further details  Continue PPI BID, continue octreotide until variceal bleed ruled out, given 50mcg bolus yesterday and continue on 50 mcg/hr  Continue rocephin for variceal prophylaxis for total of 5 days  Would recommend starting on variceal prophylaxis, will need repeat EGD for band evaluation in 1 month  Given cirrhosis will need repeat RUQ ultrasound every 6 months for routine screening for HCC  Will follow as outpatient  ?   Symptomatic anemia  Pancytopenia  Normocytic anemia  on admission Hgb of 8.1 -> 7.1 the next day, received 2 units with appropriate response  Received 1x IV iron replacement  Pending: Folate and B12 level  ?   Differential includes peptic ulcer secondary to prolonged NSAID use, variceal bleeding given history of hematemesis, and iron deficiency anemia  Workup of pancytopenia per primary  ?   Headache  recurrent headaches and mild history of neck stiffness that has been going on for some time now  Workup per primary  Would avoid NSAIDs, can take up to 2g of tylenol daily in setting of cirrhosis  ?  ?   Disposition: EGD today reveals significant varices that are likely source of  bleed.? No gastric ulcers noted.? Will set up for follow up as outpatient, will continue to follow at this time, likely stable for discharge tomorrow pending primary team disposition.?  ?  Alexis Caba MD  Internal Medicine PGY3   Patient seen and examined in conjunction with the resident during rounds on 02/22?. ?I actively participated in all aspects of [facility] patient encounter along with the resident, and agree with the assessment and plan as outlined above. ?  ?   EGD with large varices with stigmata s/p banding x 6.? He did have some evidence of gastritis as well.? Overall presentation is?somewhat unusual for variceal bleeding, but no other?significant bleeding lesion seen on EGD.? He does report some chest pressure since the banding. ?He is otherwise tolerating a clear diet for now.? We will keep him on octreotide drip?along with PPI. ?Will reevaluate in the morning?regarding duration of therapy for octreotide. ?Continue?ceftriaxone for SBP prophylaxis.? He will need repeat banding in 1 month given incomplete eradication?on todays EGD.? Continue?IV iron replacement.? We will try to advance to a soft diet tomorrow if doing well.  ?

## 2022-05-21 NOTE — HISTORICAL [facility] CERNER
This is a historical note converted from [employer]. Formatting and pictures may have been removed.  Please reference [place] for original formatting and attached multimedia. Subjective  Mr. Ugo Zimmerman is a 56 year old  male with a past medical history of hepatitis C cirrhosis (diagnosed 7 years ago), diabetes mellitus type 2 who presented to the emergency department with complaints of generalized weakness and fatigue, as well as about a 3 week history of melena and an episode of large volume coffee ground emesis about 4 days prior to admission.? He has extensive NSAID use over the past month due to headaches.? GI was consulted for evaluation.? Hemoglobin dropped from 8.1 -> 7.1, he was transfused 2 units pRBCs and given IV iron replacement x 1.? Underwent an Egd on 2/22/22 which showed large esophageal varices, had banding x 6.  ?   Interval History:  Patient reported that overnight he had severe headaches that were described as constant?throbbing, pain with 10/10.? Still reports some chest discomfort but it is better than yesterday, just had an EGD.? Denied any neurologic symptoms, seizure like activity, or photophobia.? He reported that after drinking a cup of coffee the pain subsided.? He denies any other symptoms and feels well overall.?  Review of Systems  12 point ROS negative unless otherwise stated above  Objective  Vitals & Measurements  T:?36.7? ?C (Oral)? TMIN:?36.7? ?C (Oral)? TMAX:?37? ?C (Oral)? HR:?71(Peripheral)? RR:?18? BP:?109/66? SpO2:?97%?  Physical Exam  Gen: He is alert and oriented x3. Well developed, well-nourished, NAD.  Head: Normocephalic  Eyes: PERRL, EOMI, no conjunctival injection or pallor, no scleral icterus  Nose: No nasal discharge  Throat: No pharyngeal inflammation, erythema, discharge, mucous membranes moist  Neck: Supple. No carotid bruits.? No lymphadenopathy or thyromegaly.  Lungs: Clear to auscultation bilaterally  CV: Normal S1 & S2, RRR, no murmurs  appreciated  Abdomen: Soft, NT/ND, bowel sounds present.? No hepatosplenomegaly  Ext: No cyanosis/clubbing/edema, pulses 2+  Neuro:?CN II-XII grossly intact, strength 5/5 throughout, normal sensation  Psych: Flat affect, but denies suicidal or homicidal ideations  Skin: No rashes, lesions, ulceration or induration  Lab Results  Labs Last 24 Hours?  ?Chemistry? Hematology/Coagulation?   Sodium Lvl:?135 mmol/L?Low (02/23/22 04:07:00) WBC:?3.1 x10(3)/mcL?Low (02/23/22 04:07:00)   Potassium Lvl: 3.9 mmol/L (02/23/22 04:07:00) RBC:?3.16 x10(6)/mcL?Low (02/23/22 04:07:00)   Chloride: 106 mmol/L (02/23/22 04:07:00) Hgb:?9.7 gm/dL?Low (02/23/22 04:07:00)   CO2: 22 mmol/L (02/23/22 04:07:00) Hct:?26.7 %?Low (02/23/22 04:07:00)   Calcium Lvl:?8.2 mg/dL?Low (02/23/22 04:07:00) Platelet:?120 x10(3)/mcL?Low (02/23/22 04:07:00)   Glucose Lvl:?131 mg/dL?High (02/23/22 04:07:00) MCV: 84.5 fL (02/23/22 04:07:00)   BUN: 10.5 mg/dL (02/23/22 04:07:00) MCH: 30.7 pg (02/23/22 04:07:00)   Creatinine: 0.87 mg/dL (02/23/22 04:07:00) MCHC: 36.3 gm/dL (02/23/22 04:07:00)   Est Creat Clearance Ser: 90.61 mL/min (02/23/22 05:18:54) RDW: 14.3 % (02/23/22 04:07:00)   eGFR-AA: >105 (02/23/22 04:07:00) MPV: 10.4 fL (02/23/22 04:07:00)   eGFR-MARIA ANTONIA: 96 mL/min/1.73 m2 (02/23/22 04:07:00) Abs Neut: 2.16 x10(3)/mcL (02/23/22 04:07:00)   Bili Total: 1.2 mg/dL (02/23/22 04:07:00) Neutro Auto: 69 % (02/23/22 04:07:00)   Bili Direct: 0.5 mg/dL (02/23/22 04:07:00) Lymph Auto: 16 % (02/23/22 04:07:00)   Bili Indirect: 0.7 mg/dL (02/23/22 04:07:00) Mono Auto: 11 % (02/23/22 04:07:00)   AST: 33 unit/L (02/23/22 04:07:00) Eos Auto: 2 % (02/23/22 04:07:00)   ALT: 25 unit/L (02/23/22 04:07:00) Abs Eos: 0.1 x10(3)/mcL (02/23/22 04:07:00)   Alk Phos: 68 unit/L (02/23/22 04:07:00) Basophil Auto: 1 % (02/23/22 04:07:00)   Total Protein:?6 gm/dL?Low (02/23/22 04:07:00) Abs Neutro: 2.16 x10(3)/mcL (02/23/22 04:07:00)   Albumin Lvl:?3.3 gm/dL?Low (02/23/22 04:07:00) Abs  Lymph:?0.5 x10(3)/mcL?Low (02/23/22 04:07:00)   Globulin: 2.7 gm/dL (02/23/22 04:07:00) Abs Mono: 0.4 x10(3)/mcL (02/23/22 04:07:00)   A/G Ratio: 1.2 ratio (02/23/22 04:07:00) Abs Baso: 0 x10(3)/mcL (02/23/22 04:07:00)   Folate Lvl: 10.6 ng/mL (02/23/22 04:07:00) NRBC%: 0.0 (02/23/22 04:07:00)   Vitamin B12 Lvl:?1004 pg/mL?High (02/23/22 04:07:00) IG%: 1 % (02/23/22 04:07:00)    IG#: 0.04 (02/23/22 04:07:00)   Assessment/Plan  Orders:  octreotide 500 mcg + Sodium Chloride 0.9% intravenous solution 500 mL, 500 mL, 500 mL, IV, 50 mL/hr, start date 02/22/22 10:16:00 CST, 1.94, m2  sodium ferric gluconate complex, 125 mg, form: Injection, IV Piggyback, Once-NOW, Infuse over: 1 hr, first dose 02/23/22 9:19:00 CST, stop date 02/23/22 9:19:00 CST  Cirrhosis  Large esophageal varices  Due to HCV  MELD-Na score of 12, 90 day mortality <2%  Child Chaudhry Class A, only trace ascites seen on ultrasound  Currently not on any medications, does not appear fluid overloaded  EGD shows large esophageal variceal bleed which was banded x 6  Given cirrhosis will need repeat RUQ ultrasound every 6 months for routine screening for HCC  Has received rocephin, today is day 5  Currently on octreotide, Hgb is stable, can discontinue today  ?   Symptomatic anemia  Pancytopenia  Normocytic anemia  Received 2u pRBCs, has not required transfusion in the past  Received 1x IV iron replacement, will give 1 more dose today  B12 level high, folate LLN  Pancytopenia stable for now, slightly improving from admission  ?   Headache  recurrent headaches and mild history of neck stiffness that has been going on for a month now  Workup per primary  Would avoid NSAIDs, can take up to 2g of tylenol daily in setting of cirrhosis  ?  ?   Disposition: EGD performed yesterday with banding of varices. Would discharge on ferrous sulfate 325mg every other day, coreg 6.25mg BID, and protonix 40mg once daily.? Repeat EGD scheduled for 1 month to revisit banding.? Stable  for discharge from GI standpoint, discharge planning per primary.  ?   Alexis Caba MD  Internal Medicine PGY3   Patient seen and examined in conjunction with the resident during rounds on 02/23. ?I actively participated in all aspects of todays patient encounter along with the resident, and agree with the assessment and plan as outlined above. ?  ?

## 2022-05-21 NOTE — HISTORICAL [facility]
This is a historical note converted from [employer]. Formatting and pictures may have been removed.  Please reference [employer] for original formatting and attached multimedia. Chief Complaint  c/o black and bloody stools x2 weeks ago. Also reports coffee ground emesis. Reporting weakness and HA. Hx of liver cirrhosis and DM.   Reason for Consultation  Hematemesis, HCV cirrhosis  History of Present Illness  Mr. Ugo Zimmerman is a 56 year old  male with a past medical history of hepatitis C cirrhosis (diagnosed 7 years ago), diabetes mellitus type 2 who presented to the emergency department with complaints of generalized weakness and fatigue, as well as about a 3 week history of melena and an episode of large volume coffee ground emesis about 4 days ago.? The fatigue has been progressive over the past 4 weeks.? He also reports some fevers, decreased appetite, and headaches over the past month.? He reports that he also has been taking over the counter ibuprofen, about 2 pills 3 times daily, he does not know what the dosage was.? He denies any dysphagia, odynophagia, any additional episodes of hematemesis, diarrhea, constipation, or abdominal pain.? Denies any bright red blood in his stools or in his vomitus.? He has never required a blood transfusion in the past.? Regarding his HCV history, he was initially diagnosed about 7 years ago and treated with ribavirin with an outside provider.? He had an EGD at the time which he reports was normal. He has not had another one.? He reports that he was treated with Epclusa after his discovery of recurrence but has not had an SVR tested.? Upon presentation to the ED, he was hemodynamically stable.? He had visible pallor and appeared fatigued.? He was found to have a Hgb level of 8.1, normocytic anemia?which upon repeat decreased to 7.1.? INR of 1.29, otherwise liver function tests within normal limits.? He was admitted to the internal medicine unit for monitoring.? GI  was consulted for symptomatic anemia with history of cirrhosis.  ?  PMHx:  Hepatitis C (does not know how he got HCV) s/p treatment x 2  Cirrhosis secondary to above  Diabetes mellitus type 2  ?  Past Surgical History  EGD in 2015  ?  Social History  Tobacco use: quit smoking in 72  Alcohol use: denies  illicit drug use: uses marijuana daily  Review of Systems  CONSTITUTIONAL:?(+) Believes he lost weight, unsure how much, subjective fever, chills, weakness or fatigue.?  HEENT: Eyes: No visual loss, blurred vision, double vision or yellow sclerae. Ears, Nose, Throat: No hearing loss, sneezing, congestion, runny nose or sore throat.?  SKIN: No rash or itching.?  CARDIOVASCULAR: No chest pain, chest pressure or chest discomfort. No palpitations or edema.?  RESPIRATORY: No shortness of breath, cough or sputum.?  GASTROINTESTINAL: No anorexia, nausea, vomiting or diarrhea. No abdominal pain or blood.?  GENITOURINARY: No dysuria, hematuria, or incontinence  NEUROLOGICAL: No headache, dizziness, syncope, paralysis, ataxia, numbness or tingling in the extremities. No change in bowel or bladder control.?  MUSCULOSKELETAL: No muscle, back pain, joint pain or stiffness.?  HEMATOLOGIC: No anemia, bleeding or bruising.?  LYMPHATICS: No enlarged nodes.  PSYCHIATRIC: No history of depression or anxiety.?  ENDOCRINOLOGIC: No reports of sweating, cold or heat intolerance. No polyuria or polydipsia.?  ALLERGIES: No history of asthma, hives, eczema or rhinitis.  Physical Exam  Vitals & Measurements  T:?36.7? ?C (Oral)? TMIN:?36.4? ?C (Oral)? TMAX:?37.2? ?C (Oral)? HR:?96(Monitored)? RR:?18? BP:?112/74? SpO2:?98%? WT:?80.2?kg? BMI:?26.89?  Gen: He is alert and oriented x3. Well developed, well-nourished, NAD.  Head: Normocephalic  Eyes: PERRL, EOMI, no conjunctival injection or pallor, no scleral icterus  Nose: No nasal discharge  Throat: No pharyngeal inflammation, erythema, discharge, mucous membranes moist  Neck: Supple. No carotid  bruits.? No lymphadenopathy or thyromegaly.  Lungs: Clear to auscultation bilaterally  CV: Normal S1 & S2, RRR, no murmurs appreciated  Abdomen: Soft, NT/ND, bowel sounds present.? No hepatosplenomegaly  GI: External examination does not reveal any abnormalities, digital rectal exam shows a small amount of black stool in the rectal vault  Ext: No cyanosis/clubbing/edema, pulses 2+  Neuro:?CN II-XII grossly intact, strength 5/5 throughout, normal sensation  Psych: Flat affect, but denies suicidal or homicidal ideations  Skin: No rashes, lesions, ulceration or induration  Assessment/Plan  Cirrhosis  Due to HCV  MELD-Na score of 12, 90 day mortality <2%  Child Chaudhry Class A, only trace ascites seen on ultrasound  Currently not on any medications, does not appear fluid overloaded  Will plan for EGD at this time to screen for varices  Last EGD was 7 years ago which was normal per patient, Last Colonoscopy also was 7 years ago and normal  Will request records from [facility] regarding history  ?  Symptomatic anemia  Pancytopenia  Normocytic anemia  on admission Hgb of 8.1 -> 7.1 the next day, currently transfusing 2 units  Ferritin of 25.45, will give 1x IV iron replacement  Pending: Folate and B12 level  Continue PPI BID  Differential includes peptic ulcer secondary to prolonged NSAID use, variceal bleeding given history of hematemesis, and iron deficiency anemia  Will need EGD to evaluate for bleeding ulcers as well  Workup of pancytopenia per primary  ?   Disposition: Plan for EGD for variceal screening.? Monitor for signs of GI bleed, discontinue any NSAIDs.?Will need to be set up with outpatient GI follow up for cirrhosis.  ?   Alexis Caba MD  Internal Medicine PGY3   Problem List/Past Medical History  Ongoing  Diabetes mellitus  Hepatitis C  Historical  No qualifying data  Procedure/Surgical History  Cholecystectomy   Medications  Inpatient  Dextrose 50% and Water (50 mL vial/syringe), 12.5 gm= 25 mL, IV Push, Once,  PRN  Dextrose 50% and Water (50 mL vial/syringe), 12.5 gm= 25 mL, IV Push, As Directed, PRN  Dextrose 50% and Water (50 mL vial/syringe), 25 gm= 50 mL, IV Push, As Directed, PRN  Dextrose 50% in Water intravenous solution, 12.5 gm= 25 mL, IV Push, As Directed, PRN  diphenhydrAMINE 25 mg oral capsule, 25 mg= 1 cap(s), Oral, qPM, PRN  glucagon recombinant 1 mg injection, 1 mg= 1 EA, IM, q10min, PRN  glucagon recombinant 1 mg injection, 1 mg= 1 EA, IM, q10min, PRN  glucose 40% oral gel, 15 gm= 1 tube(s), Oral, As Directed, PRN  insulin lispro 100 units/mL subcutaneous injection, 2-14 units, Subcutaneous, As Directed, PRN  Lovenox, 40 mg= 0.4 mL, Subcutaneous, Daily  Protonix, 40 mg= 1 EA, IV Push, BID  Rocephin (for IVPB)  Xanax 0.25 mg oral tablet, 1 mg= 4 tab(s), Oral, BID  Home  Benadryl 50 mg oral tablet, 1 tab, Oral, At Bedtime  Benadryl Ultratab 25 mg oral tablet, 25 mg= 1 tab(s), Oral, Once a day (at bedtime), PRN  metformin 850 mg oral tablet, 850 mg= 1 tab(s), Oral, Once  Xanax 0.5 mg oral tablet, 1 mg= 2 tab(s), Oral, TID  Allergies  No Known Allergies  No Known Medication Allergies  Social History  Abuse/Neglect  No, 2022  Tobacco  Never (less than 100 in lifetime), No, 2022  Never smoker, 2018  Family History  Cancer: Mother and Brother.  Diabetes mellitus type 2: Father.  Lab Results  Labs Last 24 Hours?  ?Chemistry? Hematology/Coagulation?   Sodium Lvl: 136 mmol/L (22 04:35:44) WBC:?2.5 x10(3)/mcL?Low (22 04:35:44)   Potassium Lvl: 3.7 mmol/L (22 04:35:44) RBC:?2.26 x10(6)/mcL?Low (22 04:35:44)   Chloride: 107 mmol/L (22 04:35:44) Hgb:?7.1 gm/dL?Low (22 04:35:44)   CO2: 25 mmol/L (22 04:35:44) Hct:?19.4 %?Critical (22 04:35:44)   Calcium Lvl:?7.8 mg/dL?Low (22 04:35:44) Platelet:?105 x10(3)/mcL?Low (22 04:35:44)   Glucose Lvl:?130 mg/dL?High (22 04:35:44) MCV: 85.8 fL (22 04:35:44)   EA.5 mg/dL (22  15:06:00) MCH: 31.4 pg (02/21/22 04:35:44)   BUN: 14.7 mg/dL (02/21/22 04:35:44) MCHC: 36.6 gm/dL (02/21/22 04:35:44)   Creatinine: 0.79 mg/dL (02/21/22 04:35:44) RDW: 13.9 % (02/21/22 04:35:44)   eGFR-AA: >105 (02/21/22 04:35:44) MPV: 9.9 fL (02/21/22 04:35:44)   eGFR-MARIA ANTONIA: >105 (02/21/22 04:35:44) Abs Neut:?1.75 x10(3)/mcL?Low (02/21/22 04:35:44)   Bili Total: 0.6 mg/dL (02/21/22 04:35:44) Neutro Auto: 70 % (02/21/22 04:35:44)   Bili Direct: 0.3 mg/dL (02/21/22 04:35:44) Lymph Auto: 17 % (02/21/22 04:35:44)   Bili Indirect: 0.3 mg/dL (02/21/22 04:35:44) Mono Auto: 10 % (02/21/22 04:35:44)   AST: 23 unit/L (02/21/22 04:35:44) Eos Auto: 2 % (02/21/22 04:35:44)   ALT: 22 unit/L (02/21/22 04:35:44) Abs Eos: 0 x10(3)/mcL (02/21/22 04:35:44)   Alk Phos: 57 unit/L (02/21/22 04:35:44) Basophil Auto: 0 % (02/21/22 04:35:44)   Total Protein:?5.5 gm/dL?Low (02/21/22 04:35:44) Abs Neutro:?1.75 x10(3)/mcL?Low (02/21/22 04:35:44)   Albumin Lvl:?3 gm/dL?Low (02/21/22 04:35:44) Abs Lymph:?0.4 x10(3)/mcL?Low (02/21/22 04:35:44)   Globulin: 2.5 gm/dL (02/21/22 04:35:44) Abs Mono: 0.2 x10(3)/mcL (02/21/22 04:35:44)   A/G Ratio: 1.2 ratio (02/21/22 04:35:44) Abs Baso: 0 x10(3)/mcL (02/21/22 04:35:44)   Iron Lvl:?16 ug/dL?Low (02/20/22 15:31:00) NRBC%: 0.0 (02/21/22 04:35:44)   Transferrin: 256 mg/dL (02/20/22 15:31:00) IG%: 1 % (02/21/22 04:35:44)   TIBC: 281 ug/dL (02/20/22 15:31:00) IG#: 0.03 (02/21/22 04:35:44)   Iron Sat:?6 %?Low (02/20/22 15:31:00) PT:?15.9 second(s)?High (02/20/22 15:06:00)   Ferritin Lvl: 25.45 ng/mL (02/20/22 15:31:00) INR:?1.29?High (02/20/22 15:06:00)   Hgb A1c: 6.8 % (02/20/22 15:06:00) PTT: 31 second(s) (02/20/22 15:06:00)   UIBC:?265 ug/dL?High (02/20/22 15:31:00)    Diagnostic Results  Accession:?[id number]  Order:?US Abdomen Complete  Report Dt/Tm:?02/21/2022 09:30  Report:?  ?  History.  Cirrhosis  ?  Reference.  No previous studies are available for comparison.  ?  Findings.  Grayscale, color and  spectral Doppler evaluation of the abdomen.  ?  Pancreas obscured.  ?  Visualized portions of the aorta and inferior vena cava are normal in  caliber. ?  ?  Small cirrhotic liver. No focal suspicious liver lesion is seen.  Normal hepatopetal flow is noted in the portal vein.?  ?  Gallbladder surgically absent. The common bile duct is normal in  caliber and measures 5 mm.?  ?  The right kidney measures 12 cm, while the left measures 13 cm. There  is no hydronephrosis. Possible small nonobstructing calculus right  kidney.?  ?  Spleen is enlarged measuring 16 cm.  ?  Trace ascites.  ?  Impression:  1. Cirrhosis.  2. Patent portal vein.  3. Splenomegaly.  4. Trace ascites.  5. Questionable nonobstructing nephrolithiasis right kidney.  ?  ?      Patient seen and examined in conjunction with the resident during rounds on 02/21. ?I actively participated in all aspects of [facility] patient encounter along with the resident, and agree with the assessment and plan as outlined above. ?  ?   Patient with HCV cirrhosis and NSAID use presented with weakness, melena, and EDWARD.? s/p PRBCs today.? No BM or emesis since admission.? Needs an EGD for further?evaluation but not NPO today.  Continue PPI BID.? Will start octreotide until EGD completed.??Trace ascites, on ceftriaxone.? keep NPO and will see?if can do EGD tomorrow between clinic appointments.  ?

## 2022-06-02 ENCOUNTER — ANESTHESIA EVENT (OUTPATIENT)
Dept: ENDOSCOPY | Facility: HOSPITAL | Age: 57
End: 2022-06-02

## 2022-06-02 DIAGNOSIS — I85.00 ESOPHAGEAL VARICES WITHOUT BLEEDING, UNSPECIFIED ESOPHAGEAL VARICES TYPE: Primary | ICD-10-CM

## 2022-06-02 NOTE — TELEPHONE ENCOUNTER
----- Message from Lorraine Rivas sent at 6/2/2022 10:07 AM CDT -----  Regarding: Med refill  Pt called and stated he was out of his BETA BLOCKER CARVEDILOL to be sent to [facility] in [place]. Pt also wanted to confirm his procedure for Monday 6/6/22. Pt can be reached @ [phone]        Thank You  Stephani FITZPATRICK

## 2022-06-02 NOTE — ANESTHESIA PREPROCEDURE EVALUATION
06/02/2022  Ugo Zimmerman is a 56 y.o., male   For EGD history of esophageal varices with bleeding in Feb 2022, DM2 & Hepatitis C     There were no vitals filed for this visit.    Lab Results   Component Value Date    WBC 3.1 02/23/2022    HGB 9.7 02/23/2022    HCT 26.7 02/23/2022     02/23/2022    ALT 25 02/23/2022    AST 33 02/23/2022     02/23/2022    K 3.9 02/23/2022    CREATININE 0.87 02/23/2022    BUN 10.5 02/23/2022    CO2 22 02/23/2022    INR 1.29 02/20/2022    HGBA1C 6.8 02/20/2022       .      Pre-op Assessment    I have reviewed the Patient Summary Reports.     I have reviewed the Nursing Notes. I have reviewed the NPO Status.   I have reviewed the Medications.     Review of Systems  Anesthesia Hx:  No previous Anesthesia  Neg history of prior surgery. Denies Family Hx of Anesthesia complications.   Denies Personal Hx of Anesthesia complications.   Social:  Non-Smoker    Hematology/Oncology:  Hematology Normal   Oncology Normal     EENT/Dental:EENT/Dental Normal   Cardiovascular:  Cardiovascular Normal Exercise tolerance: good     Pulmonary:  Pulmonary Normal    Renal/:  Renal/ Normal     Hepatic/GI:   Liver Disease, Hepatitis, C    Neurological:  Neurology Normal    Endocrine:   Diabetes    Dermatological:  Skin Normal    Psych:  Psychiatric Normal           Physical Exam  General: Well nourished, Cooperative, Alert and Oriented    Airway:  Mallampati: I / I  Mouth Opening: Normal  TM Distance: Normal  Tongue: Large, Normal  Neck ROM: Normal ROM    Dental:  Intact        Anesthesia Plan  Type of Anesthesia, risks & benefits discussed:    Anesthesia Type: MAC  Intra-op Monitoring Plan: Standard ASA Monitors  Post Op Pain Control Plan: IV/PO Opioids PRN  (medical reason for not using multimodal pain management)  Induction:  IV  Informed Consent: Informed consent signed with the  Patient and all parties understand the risks and agree with anesthesia plan.  All questions answered. Patient consented to blood products? No  ASA Score: 3  Day of Surgery Review of History & Physical: H&P Update referred to the surgeon/provider.I have interviewed and examined the patient. I have reviewed the patient's H&P dated: There are no significant changes. H&P completed by Anesthesiologist.    Ready For Surgery From Anesthesia Perspective.     .

## 2022-06-02 NOTE — TELEPHONE ENCOUNTER
Returned pt call. Call will not go through on his number. I called his spouse. Confirmed EGD for Monday 6/6/22. Notified refill request was sent to Dr. Kong.  Verbalized understanding

## 2022-06-03 RX ORDER — CARVEDILOL 3.12 MG/1
6.25 TABLET ORAL 2 TIMES DAILY
Qty: 120 TABLET | Refills: 6 | Status: SHIPPED | OUTPATIENT
Start: 2022-06-03 | End: 2022-12-05 | Stop reason: CLARIF

## 2022-06-06 ENCOUNTER — HOSPITAL ENCOUNTER (OUTPATIENT)
Facility: HOSPITAL | Age: 57
Discharge: HOME | End: 2022-06-06
Attending: INTERNAL MEDICINE | Admitting: INTERNAL MEDICINE

## 2022-06-06 ENCOUNTER — ANESTHESIA (OUTPATIENT)
Dept: ENDOSCOPY | Facility: HOSPITAL | Age: 57
End: 2022-06-06

## 2022-06-06 VITALS
WEIGHT: 170.63 LBS | RESPIRATION RATE: 20 BRPM | HEIGHT: 69 IN | TEMPERATURE: 98 F | DIASTOLIC BLOOD PRESSURE: 79 MMHG | BODY MASS INDEX: 25.27 KG/M2 | SYSTOLIC BLOOD PRESSURE: 122 MMHG | OXYGEN SATURATION: 100 % | HEART RATE: 76 BPM

## 2022-06-06 DIAGNOSIS — I85.10 SECONDARY ESOPHAGEAL VARICES WITHOUT BLEEDING: Primary | ICD-10-CM

## 2022-06-06 LAB
CTP QC/QA: YES
GLUCOSE SERPL-MCNC: 155 MG/DL (ref 70–110)
SARS-COV-2 AG RESP QL IA.RAPID: NEGATIVE

## 2022-06-06 PROCEDURE — 63600175 PHARM REV CODE 636 W HCPCS

## 2022-06-06 PROCEDURE — 37000009 HC ANESTHESIA EA ADD 15 MINS: Performed by: INTERNAL MEDICINE

## 2022-06-06 PROCEDURE — 63600175 PHARM REV CODE 636 W HCPCS: Performed by: SPECIALIST

## 2022-06-06 PROCEDURE — 43235 EGD DIAGNOSTIC BRUSH WASH: CPT | Performed by: INTERNAL MEDICINE

## 2022-06-06 PROCEDURE — 25000003 PHARM REV CODE 250

## 2022-06-06 PROCEDURE — 25000003 PHARM REV CODE 250: Performed by: NURSE ANESTHETIST, CERTIFIED REGISTERED

## 2022-06-06 PROCEDURE — 63600175 PHARM REV CODE 636 W HCPCS: Performed by: NURSE ANESTHETIST, CERTIFIED REGISTERED

## 2022-06-06 PROCEDURE — 37000008 HC ANESTHESIA 1ST 15 MINUTES: Performed by: INTERNAL MEDICINE

## 2022-06-06 RX ORDER — LIDOCAINE HYDROCHLORIDE 20 MG/ML
INJECTION, SOLUTION EPIDURAL; INFILTRATION; INTRACAUDAL; PERINEURAL
Status: DISCONTINUED | OUTPATIENT
Start: 2022-06-06 | End: 2022-06-06

## 2022-06-06 RX ORDER — PROPOFOL 10 MG/ML
VIAL (ML) INTRAVENOUS
Status: DISCONTINUED | OUTPATIENT
Start: 2022-06-06 | End: 2022-06-06

## 2022-06-06 RX ORDER — SODIUM CHLORIDE, SODIUM LACTATE, POTASSIUM CHLORIDE, CALCIUM CHLORIDE 600; 310; 30; 20 MG/100ML; MG/100ML; MG/100ML; MG/100ML
INJECTION, SOLUTION INTRAVENOUS CONTINUOUS
Status: DISCONTINUED | OUTPATIENT
Start: 2022-06-06 | End: 2022-06-07 | Stop reason: HOSPADM

## 2022-06-06 RX ADMIN — PROPOFOL 30 MG: 10 INJECTION, EMULSION INTRAVENOUS at 02:06

## 2022-06-06 RX ADMIN — PROPOFOL 120 MG: 10 INJECTION, EMULSION INTRAVENOUS at 02:06

## 2022-06-06 RX ADMIN — SODIUM CHLORIDE, POTASSIUM CHLORIDE, SODIUM LACTATE AND CALCIUM CHLORIDE: 600; 310; 30; 20 INJECTION, SOLUTION INTRAVENOUS at 12:06

## 2022-06-06 RX ADMIN — PROPOFOL 20 MG: 10 INJECTION, EMULSION INTRAVENOUS at 02:06

## 2022-06-06 RX ADMIN — LIDOCAINE HYDROCHLORIDE 80 MG: 20 INJECTION, SOLUTION EPIDURAL; INFILTRATION; INTRACAUDAL; PERINEURAL at 02:06

## 2022-06-06 NOTE — H&P
[facility] GI clinic  History and Physical    SUBJECTIVE:     Chief Complaint:   Per triage note--No chief complaint on file.      History of Present Illness:  Mr. Zimmerman is a 56 y.o. male with history of cirrhosis secondary to hepatitis C complicated by an Upper GI bleed status post multiple variceal bandings.  Without subjective complaints. Denies any blood per rectum, melena, hematemesis since last being seen. Presents for variceal screening. Denies any significant Alcohol use since last seen. Tolerating diet w/o NV. Having regular bowel movements.     Allergies:  Review of patient's allergies indicates:  No Known Allergies    Home Medications:  No current facility-administered medications on file prior to encounter.     Current Outpatient Medications on File Prior to Encounter   Medication Sig    metFORMIN (GLUCOPHAGE) 850 MG tablet Take 850 mg by mouth 2 (two) times daily with meals.    pantoprazole (PROTONIX) 40 MG tablet Take by mouth once daily.    ALPRAZolam (XANAX) 1 MG tablet Take 1 mg by mouth 3 (three) times daily as needed for Anxiety.    diphenhydrAMINE (BENADRYL) 25 mg capsule Take 25 mg by mouth nightly as needed for Insomnia.       Past Medical History:   Diagnosis Date    Diabetes mellitus     Unspecified viral hepatitis C without hepatic coma      Past Surgical History:   Procedure Laterality Date    CHOLECYSTECTOMY      EGD, WITH BANDING OF VARICES N/A 5/2/2022    Procedure: EGD, WITH BANDING OF VARICES;  Surgeon: Sujata Kong MD;  Location: [facility] ENDOSCOPY;  Service: Gastroenterology;  Laterality: N/A;    ESOPHAGOGASTRODUODENOSCOPY  02/22/2022    with biopsy and bleeder control    ESOPHAGOGASTRODUODENOSCOPY  03/28/2022    wtih banding     Family History   Problem Relation Age of Onset    Cancer Mother     Cancer Brother      Social History     Tobacco Use    Smoking status: Never Smoker    Smokeless tobacco: Never Used   Substance Use Topics    Alcohol use: Not Currently      "Comment: quit 1999    Drug use: Yes     Frequency: 7.0 times per week     Types: Marijuana     Comment: "medical, daily"        Review of Systems:  All 10 ROS negative except that which is indicated in the HPI    OBJECTIVE:     Vital Signs:  Temp: 97.7 °F (36.5 °C) (06/06/22 1237)  Pulse: 72 (06/06/22 1237)  Resp: 18 (06/06/22 1237)  BP: 118/82 (06/06/22 1237)  SpO2: 98 % (06/06/22 1237)    Physical Exam:  General: well developed, well nourished, no distress  HEENT: NCAT, EOMI  Neck: supple, symmetrical  Lungs: Normal WOB, No SOB  Cardiovascular: regular rate  Abdomen: soft, nondistended, nontender to palpation  Musculoskeletal:no clubbing, cyanosis, no deformities  Neurologic: No focal numbness or weakness  Psych/Behavioral:  Alert and oriented, appropriate affect.    Laboratory:  Labs Reviewed   POCT GLUCOSE, HAND-HELD DEVICE - Abnormal; Notable for the following components:       Result Value    POC Glucose 155 (*)     All other components within normal limits   SARS CORONAVIRUS 2 ANTIGEN POCT, MANUAL READ         Diagnostic Results:       ASSESSMENT:     Mr. Zimmerman is a 56 y.o. male with history of she cirrhosis secondary to hepatitis C complicated by esophageal bleeding a status post multiple variceal bandings.    PLAN:   EGD   Consented and agreeable to procedure     Nestor Stauffer MD   [employer] General Surgery, PGY-2    "

## 2022-06-06 NOTE — TRANSFER OF CARE
"Anesthesia Transfer of Care Note    Patient: Ugo Zimmerman    Procedure(s) Performed: Procedure(s) (LRB):  EGD (ESOPHAGOGASTRODUODENOSCOPY) (N/A)    Patient location: GI    Anesthesia Type: general    Transport from OR: Transported from OR on room air with adequate spontaneous ventilation    Post pain: adequate analgesia    Post assessment: no apparent anesthetic complications    Post vital signs: stable    Level of consciousness: awake and sedated    Nausea/Vomiting: no nausea/vomiting    Complications: none    Transfer of care protocol was followed      Last vitals:   Visit Vitals  /82   Pulse 72   Temp 36.5 °C (97.7 °F)   Resp 18   Ht 5' 9" (1.753 m)   Wt 77.4 kg (170 lb 10.2 oz)   SpO2 98%   BMI 25.20 kg/m²     "

## 2022-06-06 NOTE — PROVATION PATIENT INSTRUCTIONS
Discharge Summary/Instructions after an Endoscopic Procedure  Patient Name: Ugo Weiner MRN: [id number]  Patient YOB: 1965  Monday, June 6, 2022  Sujata Kong MD  Dear patient,  As a result of recent federal legislation (The Federal Cures Act), you may   receive lab or pathology results from your procedure in your [facility]   account before your physician is able to contact you. Your physician or   their representative will relay the results to you with their   recommendations at their soonest availability.  Thank you,  RESTRICTIONS:  During your procedure today, you received medications for sedation.  These   medications may affect your judgment, balance and coordination.  Therefore,   for 24 hours, you have the following restrictions:   - DO NOT drive a car, operate machinery, make legal/financial decisions,   sign important papers or drink alcohol.    ACTIVITY:  Today: no heavy lifting, straining or running due to procedural   sedation/anesthesia.  The following day: return to full activity including work.  DIET:  Eat and drink normally unless instructed otherwise.     TREATMENT FOR COMMON SIDE EFFECTS:  - Mild abdominal pain, nausea, belching, bloating or excessive gas:  rest,   eat lightly and use a heating pad.  - Sore Throat: treat with throat lozenges and/or gargle with warm salt   water.  - Because air was used during the procedure, expelling large amounts of air   from your rectum or belching is normal.  - If a bowel prep was taken, you may not have a bowel movement for 1-3 days.    This is normal.  SYMPTOMS TO WATCH FOR AND REPORT TO YOUR PHYSICIAN:  1. Abdominal pain or bloating, other than gas cramps.  2. Chest pain.  3. Back pain.  4. Signs of infection such as: chills or fever occurring within 24 hours   after the procedure.  5. Rectal bleeding, which would show as bright red, maroon, or black stools.   (A tablespoon of blood from the rectum is not serious, especially  if   hemorrhoids are present.)  6. Vomiting.  7. Weakness or dizziness.  GO DIRECTLY TO THE NEAREST EMERGENCY ROOM IF YOU HAVE ANY OF THE FOLLOWING:      Difficulty breathing              Chills and/or fever over 101 F   Persistent vomiting and/or vomiting blood   Severe abdominal pain   Severe chest pain   Black, tarry stools   Bleeding- more than one tablespoon   Any other symptom or condition that you feel may need urgent attention  Your doctor recommends these additional instructions:  If any biopsies were taken, your doctors clinic will contact you in 1 to 2   weeks with any results.  - Patient has a contact number available for emergencies.  The signs and   symptoms of potential delayed complications were discussed with the   patient.  Return to normal activities tomorrow.  Written discharge   instructions were provided to the patient.   - Low sodium diet.   - Discharge patient to home.   - Continue present medications.   - Repeat upper endoscopy in 1 year for surveillance.   - Return to GI office as previously scheduled.  For questions, problems or results please call your physician - Sujata Kong MD at Work:  [phone].  [facility] , EMERGENCY ROOM PHONE NUMBER: [phone]  IF A COMPLICATION OR EMERGENCY SITUATION ARISES AND YOU ARE UNABLE TO REACH   YOUR PHYSICIAN - GO DIRECTLY TO THE EMERGENCY ROOM.  MD Sujaat Juarez MD  6/6/2022 3:31:47 PM  This report has been verified and signed electronically.  Dear patient,  As a result of recent federal legislation (The Federal Cures Act), you may   receive lab or pathology results from your procedure in your [facility]   account before your physician is able to contact you. Your physician or   their representative will relay the results to you with their   recommendations at their soonest availability.  Thank you,  PROVATION

## 2022-06-10 LAB — POCT GLUCOSE: 155 MG/DL (ref 70–110)

## 2022-09-15 ENCOUNTER — HISTORICAL (OUTPATIENT)
Dept: ADMINISTRATIVE | Facility: HOSPITAL | Age: 57
End: 2022-09-15
Payer: COMMERCIAL

## 2022-12-05 ENCOUNTER — OFFICE VISIT (OUTPATIENT)
Dept: GASTROENTEROLOGY | Facility: CLINIC | Age: 57
End: 2022-12-05
Payer: COMMERCIAL

## 2022-12-05 VITALS
WEIGHT: 178 LBS | HEART RATE: 76 BPM | RESPIRATION RATE: 16 BRPM | DIASTOLIC BLOOD PRESSURE: 70 MMHG | HEIGHT: 69 IN | SYSTOLIC BLOOD PRESSURE: 108 MMHG | TEMPERATURE: 98 F | BODY MASS INDEX: 26.36 KG/M2

## 2022-12-05 DIAGNOSIS — K74.69 COMPENSATED HCV CIRRHOSIS: Primary | ICD-10-CM

## 2022-12-05 DIAGNOSIS — B19.20 COMPENSATED HCV CIRRHOSIS: Primary | ICD-10-CM

## 2022-12-05 PROCEDURE — 99214 OFFICE O/P EST MOD 30 MIN: CPT | Mod: PBBFAC | Performed by: STUDENT IN AN ORGANIZED HEALTH CARE EDUCATION/TRAINING PROGRAM

## 2022-12-05 RX ORDER — ALPRAZOLAM 0.5 MG/1
0.5 TABLET ORAL 2 TIMES DAILY PRN
COMMUNITY
Start: 2022-10-23

## 2022-12-05 RX ORDER — METFORMIN HYDROCHLORIDE 500 MG/1
500 TABLET ORAL 2 TIMES DAILY
COMMUNITY
Start: 2022-08-01 | End: 2023-06-26

## 2022-12-05 RX ORDER — CARVEDILOL 6.25 MG/1
6.25 TABLET ORAL 2 TIMES DAILY
COMMUNITY
Start: 2022-11-28 | End: 2023-12-19 | Stop reason: SDUPTHER

## 2022-12-05 NOTE — PROGRESS NOTES
"Subjective:       Patient ID: Ugo Zimmerman is a 57 y.o. male.    Chief Complaint: Cirrhosis (No complaints)    From GI consult note at [facility] 2/2022: Mr. Ugo Zimmerman is a 56 year old  male with a past medical history of hepatitis C cirrhosis (diagnosed 7 years ago), diabetes mellitus type 2 who presented to the emergency department with complaints of generalized weakness and fatigue, as well as about a 3 week history of melena and an episode of large volume coffee ground emesis about 4 days ago.  The fatigue has been progressive over the past 4 weeks.  He also reports some fevers, decreased appetite, and headaches over the past month.  He reports that he also has been taking over the counter ibuprofen, about 2 pills 3 times daily, he does not know what the dosage was.  He denies any dysphagia, odynophagia, any additional episodes of hematemesis, diarrhea, constipation, or abdominal pain.  Denies any bright red blood in his stools or in his vomitus.  He has never required a blood transfusion in the past.  Regarding his HCV history, he was initially diagnosed about 7 years ago and treated with ribavirin with an outside provider.  He had an EGD at the time which he reports was "normal." He has not had another one.  He reports that he was treated with Epclusa after his discovery of recurrence but has not had an SVR tested.  Upon presentation to the ED, he was hemodynamically stable.  He had visible pallor and appeared fatigued.  He was found to have a Hgb level of 8.1, normocytic anemia which upon repeat decreased to 7.1.  INR of 1.29, otherwise liver function tests within normal limits.  He was admitted to the internal medicine unit for monitoring.  GI was consulted for symptomatic anemia with history of cirrhosis.  EGD report done on 2/22/22: - Esophagogastric landmarks identified.       - Recently bleeding grade III and large (> 5 mm) esophageal varices.        Incompletely eradicated. Banded.       - " Erosive gastropathy. Biopsied.       - Erythematous mucosa in the stomach.       - A single duodenal polyp. Resected and retrieved.    At that time, he was on IV ppi, octreotide, and has undergone multiple endoscopies since the hospitalization.  Shown now this varices completely eradicated.  Currently on Coreg, ppi therapy.  Denies any further hospitalizations or any further episodes of melena or hematochezia or coffee-ground emesis.  Denies any further Advil use.  Only takes Tylenol as needed.  Dates recently had outside lab work done by his PCP will await those results to recalculate his MELD score.  Last ultrasound updated was during above hospitalization on 02/20/2022.  He has an upcoming repeat EGD in 2023.      Review of Systems   Constitutional: Negative.    HENT: Negative.     Eyes: Negative.    Respiratory: Negative.     Cardiovascular: Negative.    Gastrointestinal:  Positive for abdominal pain.   Endocrine: Negative.    Genitourinary: Negative.    Musculoskeletal: Negative.    Integumentary:  Negative.   Allergic/Immunologic: Negative.    Neurological: Negative.    Hematological: Negative.    Psychiatric/Behavioral: Negative.         Objective:   Vitals:    12/05/22 1346   BP: 108/70   Pulse: 76   Resp: 16   Temp: 98.2 °F (36.8 °C)           Physical Exam  Constitutional:       Appearance: He is obese.   HENT:      Head: Normocephalic and atraumatic.      Mouth/Throat:      Mouth: Mucous membranes are moist.      Pharynx: Oropharynx is clear.   Eyes:      Extraocular Movements: Extraocular movements intact.      Conjunctiva/sclera: Conjunctivae normal.      Pupils: Pupils are equal, round, and reactive to light.   Cardiovascular:      Rate and Rhythm: Normal rate and regular rhythm.      Pulses: Normal pulses.      Heart sounds: Normal heart sounds.   Pulmonary:      Effort: Pulmonary effort is normal.      Breath sounds: Normal breath sounds.   Abdominal:      General: Abdomen is flat. Bowel sounds are  normal.      Palpations: Abdomen is soft.   Musculoskeletal:         General: Normal range of motion.   Skin:     General: Skin is warm and dry.   Neurological:      General: No focal deficit present.      Mental Status: He is alert and oriented to person, place, and time. Mental status is at baseline.   Psychiatric:         Mood and Affect: Mood normal.         Thought Content: Thought content normal.         Judgment: Judgment normal.       Assessment:       Problem List Items Addressed This Visit    None  Visit Diagnoses       Compensated HCV cirrhosis    -  Primary              Plan:       Background:  Alcohol: yes, in the past    Tobacco: no    Liver disease: HCV    MELD-Na: 12, repeat MELD labs  Child-Chaudhry Class: A    Transplant: not under evaluation, low MELD    Cirrhosis is decompensated with:    Ascites: Spontaneous bacterial peritonitis: No  Hepatic Encephalopathy: No  Hepatocellular carcinoma: No  Hepatorenal syndrome: No  Hyponatremia: No  Muscle wasting: No  Portal vein thrombosis: No    Screening:  Last EGD: 6/6/22: Scars in the middle third of the esophagus and                          in the lower third of the esophagus.                          - Esophagogastric landmarks identified.                          - Portal hypertensive gastropathy.                          - Normal examined duodenum.       Last imaging study:  Abd US 2/20/22:  Cirrhotic appearing liver, splenomegaly, trace ascites.    CIRRHOSIS COUNSELING:  - strict abstinence of alcohol use  - low sodium (salt) 2000mg per day  - Nutrition: 25-30kcal (calorie per body weight in kilogram) per day  - No need to restrict protein in diet  - High protein diet: 1.2-1.5 gram/kg (protein per body weight in kg) per day to prevent muscle mass loss  - Resistance exercises for muscle strength  - Avoid raw seafoods due to risk of fatal Vibrio vulnificus infection  - Avoid Non-steroidal anti-inflammatory drugs (NSAIDs) such as ibuprofen, Motrin,  naproxen, Aleve due to risk of kidney damage  - Can take acetaminophen (tylenol), no more than 2000mg per day  - Compliance with all medications  - Ultrasound or MRI of the liver every 6 months for liver cancer screening  - Upper endoscopy every 1-2 years to screen for varices    Four-month follow-up, acquiring his MELD labs at outside facility.    Ultrasound abdomen to be done in 2 months.    Next EGD slated 2023.

## 2023-01-11 ENCOUNTER — HOSPITAL ENCOUNTER (OUTPATIENT)
Dept: RADIOLOGY | Facility: HOSPITAL | Age: 58
Discharge: HOME | End: 2023-01-11
Attending: STUDENT IN AN ORGANIZED HEALTH CARE EDUCATION/TRAINING PROGRAM
Payer: COMMERCIAL

## 2023-01-11 DIAGNOSIS — K74.69 COMPENSATED HCV CIRRHOSIS: ICD-10-CM

## 2023-01-11 DIAGNOSIS — B19.20 COMPENSATED HCV CIRRHOSIS: ICD-10-CM

## 2023-01-11 PROCEDURE — 76705 ECHO EXAM OF ABDOMEN: CPT | Mod: TC

## 2023-06-26 ENCOUNTER — OFFICE VISIT (OUTPATIENT)
Dept: GASTROENTEROLOGY | Facility: CLINIC | Age: 58
End: 2023-06-26
Payer: COMMERCIAL

## 2023-06-26 VITALS
BODY MASS INDEX: 26.51 KG/M2 | WEIGHT: 179 LBS | SYSTOLIC BLOOD PRESSURE: 121 MMHG | DIASTOLIC BLOOD PRESSURE: 80 MMHG | RESPIRATION RATE: 16 BRPM | HEIGHT: 69 IN | HEART RATE: 89 BPM | TEMPERATURE: 98 F

## 2023-06-26 DIAGNOSIS — B19.20 COMPENSATED HCV CIRRHOSIS: Primary | ICD-10-CM

## 2023-06-26 DIAGNOSIS — K74.69 COMPENSATED HCV CIRRHOSIS: Primary | ICD-10-CM

## 2023-06-26 PROCEDURE — 99214 OFFICE O/P EST MOD 30 MIN: CPT | Mod: PBBFAC | Performed by: STUDENT IN AN ORGANIZED HEALTH CARE EDUCATION/TRAINING PROGRAM

## 2023-06-26 RX ORDER — METFORMIN HYDROCHLORIDE 1000 MG/1
1000 TABLET ORAL 2 TIMES DAILY
COMMUNITY
Start: 2023-04-11

## 2023-06-26 NOTE — PROGRESS NOTES
"Subjective     Patient ID: Ugo Zimmerman is a 57 y.o. male.    Chief Complaint: Cirrhosis (No complaints)    From GI consult note at [facility] 2/2022: Mr. Ugo Zimmerman is a 56 year old  male with a past medical history of hepatitis C cirrhosis (diagnosed 7 years ago), diabetes mellitus type 2 who presented to the emergency department with complaints of generalized weakness and fatigue, as well as about a 3 week history of melena and an episode of large volume coffee ground emesis about 4 days ago.  The fatigue has been progressive over the past 4 weeks.  He also reports some fevers, decreased appetite, and headaches over the past month.  He reports that he also has been taking over the counter ibuprofen, about 2 pills 3 times daily, he does not know what the dosage was.  He denies any dysphagia, odynophagia, any additional episodes of hematemesis, diarrhea, constipation, or abdominal pain.  Denies any bright red blood in his stools or in his vomitus.  He has never required a blood transfusion in the past.  Regarding his HCV history, he was initially diagnosed about 7 years ago and treated with ribavirin with an outside provider.  He had an EGD at the time which he reports was "normal." He has not had another one.  He reports that he was treated with Epclusa after his discovery of recurrence but has not had an SVR tested.  Upon presentation to the ED, he was hemodynamically stable.  He had visible pallor and appeared fatigued.  He was found to have a Hgb level of 8.1, normocytic anemia which upon repeat decreased to 7.1.  INR of 1.29, otherwise liver function tests within normal limits.  He was admitted to the internal medicine unit for monitoring.  GI was consulted for symptomatic anemia with history of cirrhosis.  EGD report done on 2/22/22: - Esophagogastric landmarks identified.       - Recently bleeding grade III and large (> 5 mm) esophageal varices.        Incompletely eradicated. Banded.       - " Erosive gastropathy. Biopsied.       - Erythematous mucosa in the stomach.       - A single duodenal polyp. Resected and retrieved.     At that time, he was on IV ppi, octreotide, and has undergone multiple endoscopies since the hospitalization.  Shown now this varices completely eradicated.  Currently on Coreg, ppi therapy.  Denies any further hospitalizations or any further episodes of melena or hematochezia or coffee-ground emesis.  Denies any further Advil use.  Only takes Tylenol as needed.  Dates recently had outside lab work done by his PCP will await those results to recalculate his MELD score.  Last ultrasound updated was during above hospitalization on 02/20/2022.  He has an upcoming repeat EGD in 2023.    06/26/2023:  No complaints today, had lab work done recently at outside facility in [place] we will request those results as his MELD labs need to be updated.  Up-to-date on HCC screening January this year showing cirrhotic appearing liver with no masses.  States his diabetes has been better controlled as of recently after some dietary changes now running CBG is in the -130s.  His EGD is upcoming on 07/03/2023, see above, his last EGD was performed 06/06/2022.  He denies any hematemesis, melena, hematochezia.  Review of Systems   Constitutional: Negative.    HENT: Negative.     Eyes: Negative.    Respiratory: Negative.     Cardiovascular: Negative.    Gastrointestinal: Negative.    Endocrine: Negative.    Genitourinary: Negative.    Musculoskeletal: Negative.    Integumentary:  Negative.   Allergic/Immunologic: Negative.    Neurological: Negative.    Hematological: Negative.    Psychiatric/Behavioral: Negative.          Objective   Vitals:    06/26/23 1547   BP: 121/80   Pulse: 89   Resp: 16   Temp: 98.2 °F (36.8 °C)         Physical Exam  Constitutional:       Appearance: Normal appearance. He is obese.   HENT:      Head: Normocephalic and atraumatic.      Mouth/Throat:      Mouth: Mucous membranes  are moist.      Pharynx: Oropharynx is clear.   Eyes:      Conjunctiva/sclera: Conjunctivae normal.      Pupils: Pupils are equal, round, and reactive to light.   Cardiovascular:      Rate and Rhythm: Normal rate and regular rhythm.      Pulses: Normal pulses.      Heart sounds: Normal heart sounds.   Pulmonary:      Effort: Pulmonary effort is normal.      Breath sounds: Normal breath sounds.   Abdominal:      General: Abdomen is flat. Bowel sounds are normal.      Palpations: Abdomen is soft.   Musculoskeletal:         General: Normal range of motion.   Skin:     General: Skin is warm and dry.   Neurological:      General: No focal deficit present.      Mental Status: He is alert and oriented to person, place, and time. Mental status is at baseline.   Psychiatric:         Mood and Affect: Mood normal.         Thought Content: Thought content normal.         Judgment: Judgment normal.        Assessment and Plan     1. Compensated HCV cirrhosis  -     US Abdomen Limited_Liver; Future; Expected date: 07/10/2023        Background:  Alcohol: yes, in the past    Tobacco: no    Liver disease: HCV    MELD-Na: 12, repeat MELD labs  Child-Chaudhry Class: A    Transplant: not under evaluation, low MELD    Cirrhosis is decompensated with:    Ascites: Spontaneous bacterial peritonitis: No  Hepatic Encephalopathy: No  Hepatocellular carcinoma: No  Hepatorenal syndrome: No  Hyponatremia: No  Muscle wasting: No  Portal vein thrombosis: No    Screening:  Last EGD: 6/6/22: Scars in the middle third of the esophagus and                          in the lower third of the esophagus.                          - Esophagogastric landmarks identified.                          - Portal hypertensive gastropathy.                          - Normal examined duodenum.       Last imaging study:  Abd US 01/11/2023: Cirrhotic appearing liver, no masses or lesions present.    CIRRHOSIS COUNSELING:  - strict abstinence of alcohol use  - low sodium (salt)  2000mg per day  - Nutrition: 25-30kcal (calorie per body weight in kilogram) per day  - No need to restrict protein in diet  - High protein diet: 1.2-1.5 gram/kg (protein per body weight in kg) per day to prevent muscle mass loss  - Resistance exercises for muscle strength  - Avoid raw seafoods due to risk of fatal Vibrio vulnificus infection  - Avoid Non-steroidal anti-inflammatory drugs (NSAIDs) such as ibuprofen, Motrin, naproxen, Aleve due to risk of kidney damage  - Can take acetaminophen (tylenol), no more than 2000mg per day  - Compliance with all medications  - Ultrasound or MRI of the liver every 6 months for liver cancer screening  - Upper endoscopy every 1-2 years to screen for varices    Six-month follow-up, updated HCC screening to be performed in July of this year.    EGD forthcoming 07/03/2023.    Obtaining labs from [facility] for updated MELD labs.         Follow up in about 6 months (around 12/26/2023).

## 2023-07-03 ENCOUNTER — ANESTHESIA EVENT (OUTPATIENT)
Dept: ENDOSCOPY | Facility: HOSPITAL | Age: 58
End: 2023-07-03
Payer: COMMERCIAL

## 2023-07-03 NOTE — ANESTHESIA PREPROCEDURE EVALUATION
07/03/2023  Ugo Zimmerman is a 57 y.o., male with DM, Hep C and cirrhosis/ esophageal varacies non-bleeding, past pancreatitis for EGD    Active Ambulatory Problems     Diagnosis Date Noted    Esophageal varices without bleeding 05/02/2022     Resolved Ambulatory Problems     Diagnosis Date Noted    No Resolved Ambulatory Problems     Past Medical History:   Diagnosis Date    Acute pancreatitis 2018    Anemia 2012    Cirrhosis 2012    Diabetes mellitus     Unspecified viral hepatitis C without hepatic coma        Past Surgical History:   Procedure Laterality Date    CHOLECYSTECTOMY      COLONOSCOPY  2018    EGD, WITH BANDING OF VARICES N/A 05/02/2022    Procedure: EGD, WITH BANDING OF VARICES;  Surgeon: Sujata Kong MD;  Location: [facility] ENDOSCOPY;  Service: Gastroenterology;  Laterality: N/A;    ESOPHAGOGASTRODUODENOSCOPY  02/22/2022    with biopsy and bleeder control    ESOPHAGOGASTRODUODENOSCOPY  03/28/2022    wtih banding    ESOPHAGOGASTRODUODENOSCOPY N/A 06/06/2022    Procedure: EGD (ESOPHAGOGASTRODUODENOSCOPY);  Surgeon: Sujata Kong MD;  Location: [facility] ENDOSCOPY;  Service: Gastroenterology;  Laterality: N/A;    ESOPHAGUS SURGERY  FEBRUARY 2022    Tameka Armando    LIVER BIOPSY  2012    Stage IV cirrhosis    LIVER SURGERY  2012    Biopsy    UPPER GASTROINTESTINAL ENDOSCOPY  Feb 2022    [place]     Lab Results   Component Value Date    WBC 3.1 02/23/2022    HGB 9.7 02/23/2022    HCT 26.7 02/23/2022     02/23/2022    ALT 25 02/23/2022    AST 33 02/23/2022     02/23/2022    K 3.9 02/23/2022    CREATININE 0.87 02/23/2022    BUN 10.5 02/23/2022    CO2 22 02/23/2022    INR 1.29 02/20/2022    HGBA1C 6.8 02/20/2022       .      Pre-op Assessment    I have reviewed the Patient Summary Reports.     I have reviewed the Nursing Notes. I have reviewed the NPO Status.    I have reviewed the Medications.     Review of Systems  Anesthesia Hx:  No problems with previous Anesthesia  Denies Family Hx of Anesthesia complications.   Denies Personal Hx of Anesthesia complications.   Hematology/Oncology:  Hematology Normal   Oncology Normal     EENT/Dental:  EENT/Dental Normal  Ears General/Symptom(s) Ear Disease: Tympanic Membrane Problem   Cardiovascular:  Cardiovascular Normal     Pulmonary:  Pulmonary Normal    Renal/:  Renal/ Normal     Hepatic/GI:  Hepatic/GI Normal    Musculoskeletal:  Musculoskeletal Normal    Neurological:  Neurology Normal    Endocrine:  Endocrine Normal    Dermatological:  Skin Normal    Psych:  Psychiatric Normal           Physical Exam  General: Well nourished    Airway:  Mallampati: IV / IV  Mouth Opening: Normal  TM Distance: Normal  Tongue: Normal  Neck ROM: Normal ROM    Dental:  Intact    Chest/Lungs:  Clear to auscultation, Normal Respiratory Rate    Heart:  Rate: Normal  Rhythm: Regular Rhythm        Anesthesia Plan  Type of Anesthesia, risks & benefits discussed:    Anesthesia Type: Gen Natural Airway  Intra-op Monitoring Plan: Standard ASA Monitors  Induction:  IV  Informed Consent: Informed consent signed with the Patient and all parties understand the risks and agree with anesthesia plan.  All questions answered.   ASA Score: 3  Day of Surgery Review of History & Physical: H&P Update referred to the surgeon/provider.I have interviewed and examined the patient. I have reviewed the patient's H&P dated: There are no significant changes. H&P completed by Anesthesiologist.    Ready For Surgery From Anesthesia Perspective.     .

## 2023-07-05 ENCOUNTER — HOSPITAL ENCOUNTER (OUTPATIENT)
Facility: HOSPITAL | Age: 58
Discharge: HOME | End: 2023-07-05
Attending: INTERNAL MEDICINE | Admitting: INTERNAL MEDICINE
Payer: COMMERCIAL

## 2023-07-05 ENCOUNTER — ANESTHESIA (OUTPATIENT)
Dept: ENDOSCOPY | Facility: HOSPITAL | Age: 58
End: 2023-07-05
Payer: COMMERCIAL

## 2023-07-05 DIAGNOSIS — I85.10 SECONDARY ESOPHAGEAL VARICES WITHOUT BLEEDING: Primary | ICD-10-CM

## 2023-07-05 LAB — POCT GLUCOSE: 130 MG/DL (ref 70–110)

## 2023-07-05 PROCEDURE — 25000003 PHARM REV CODE 250: Performed by: NURSE ANESTHETIST, CERTIFIED REGISTERED

## 2023-07-05 PROCEDURE — 82962 GLUCOSE BLOOD TEST: CPT | Performed by: INTERNAL MEDICINE

## 2023-07-05 PROCEDURE — 63600175 PHARM REV CODE 636 W HCPCS: Performed by: NURSE ANESTHETIST, CERTIFIED REGISTERED

## 2023-07-05 PROCEDURE — D9220A PRA ANESTHESIA: Mod: ,,, | Performed by: NURSE ANESTHETIST, CERTIFIED REGISTERED

## 2023-07-05 PROCEDURE — D9220A PRA ANESTHESIA: ICD-10-PCS | Mod: ,,, | Performed by: NURSE ANESTHETIST, CERTIFIED REGISTERED

## 2023-07-05 PROCEDURE — 27201423 OPTIME MED/SURG SUP & DEVICES STERILE SUPPLY: Performed by: INTERNAL MEDICINE

## 2023-07-05 PROCEDURE — 63600175 PHARM REV CODE 636 W HCPCS: Performed by: SPECIALIST

## 2023-07-05 PROCEDURE — 43244 EGD VARICES LIGATION: CPT | Performed by: INTERNAL MEDICINE

## 2023-07-05 PROCEDURE — 37000008 HC ANESTHESIA 1ST 15 MINUTES: Performed by: INTERNAL MEDICINE

## 2023-07-05 RX ORDER — PROPOFOL 10 MG/ML
VIAL (ML) INTRAVENOUS
Status: DISCONTINUED | OUTPATIENT
Start: 2023-07-05 | End: 2023-07-05

## 2023-07-05 RX ORDER — SODIUM CHLORIDE, SODIUM LACTATE, POTASSIUM CHLORIDE, CALCIUM CHLORIDE 600; 310; 30; 20 MG/100ML; MG/100ML; MG/100ML; MG/100ML
INJECTION, SOLUTION INTRAVENOUS CONTINUOUS
Status: DISCONTINUED | OUTPATIENT
Start: 2023-07-05 | End: 2023-07-05 | Stop reason: HOSPADM

## 2023-07-05 RX ORDER — LIDOCAINE HYDROCHLORIDE 20 MG/ML
INJECTION INTRAVENOUS
Status: DISCONTINUED | OUTPATIENT
Start: 2023-07-05 | End: 2023-07-05

## 2023-07-05 RX ORDER — LIDOCAINE HYDROCHLORIDE 10 MG/ML
1 INJECTION, SOLUTION EPIDURAL; INFILTRATION; INTRACAUDAL; PERINEURAL ONCE
Status: CANCELLED | OUTPATIENT
Start: 2023-07-05 | End: 2023-07-05

## 2023-07-05 RX ORDER — SODIUM CHLORIDE, SODIUM LACTATE, POTASSIUM CHLORIDE, CALCIUM CHLORIDE 600; 310; 30; 20 MG/100ML; MG/100ML; MG/100ML; MG/100ML
INJECTION, SOLUTION INTRAVENOUS CONTINUOUS
Status: CANCELLED | OUTPATIENT
Start: 2023-07-05

## 2023-07-05 RX ADMIN — PROPOFOL 30 MG: 10 INJECTION, EMULSION INTRAVENOUS at 08:07

## 2023-07-05 RX ADMIN — SODIUM CHLORIDE, POTASSIUM CHLORIDE, SODIUM LACTATE AND CALCIUM CHLORIDE: 600; 310; 30; 20 INJECTION, SOLUTION INTRAVENOUS at 08:07

## 2023-07-05 RX ADMIN — PROPOFOL 20 MG: 10 INJECTION, EMULSION INTRAVENOUS at 08:07

## 2023-07-05 RX ADMIN — PROPOFOL 150 MG: 10 INJECTION, EMULSION INTRAVENOUS at 08:07

## 2023-07-05 RX ADMIN — LIDOCAINE HYDROCHLORIDE 50 MG: 20 INJECTION INTRAVENOUS at 08:07

## 2023-07-05 NOTE — PROVATION PATIENT INSTRUCTIONS
Discharge Summary/Instructions after an Endoscopic Procedure  Patient Name: Ugo Weiner MRN: [id number]  Patient YOB: 1965  Wednesday, July 5, 2023  Sujata Kong MD  Dear patient,  As a result of recent federal legislation (The Federal Cures Act), you may   receive lab or pathology results from your procedure in your [facility]   account before your physician is able to contact you. Your physician or   their representative will relay the results to you with their   recommendations at their soonest availability.  Thank you,  RESTRICTIONS:  During your procedure today, you received medications for sedation.  These   medications may affect your judgment, balance and coordination.  Therefore,   for 24 hours, you have the following restrictions:   - DO NOT drive a car, operate machinery, make legal/financial decisions,   sign important papers or drink alcohol.    ACTIVITY:  Today: no heavy lifting, straining or running due to procedural   sedation/anesthesia.  The following day: return to full activity including work.  DIET:  Eat and drink normally unless instructed otherwise.     TREATMENT FOR COMMON SIDE EFFECTS:  - Mild abdominal pain, nausea, belching, bloating or excessive gas:  rest,   eat lightly and use a heating pad.  - Sore Throat: treat with throat lozenges and/or gargle with warm salt   water.  - Because air was used during the procedure, expelling large amounts of air   from your rectum or belching is normal.  - If a bowel prep was taken, you may not have a bowel movement for 1-3 days.    This is normal.  SYMPTOMS TO WATCH FOR AND REPORT TO YOUR PHYSICIAN:  1. Abdominal pain or bloating, other than gas cramps.  2. Chest pain.  3. Back pain.  4. Signs of infection such as: chills or fever occurring within 24 hours   after the procedure.  5. Rectal bleeding, which would show as bright red, maroon, or black stools.   (A tablespoon of blood from the rectum is not serious,  especially if   hemorrhoids are present.)  6. Vomiting.  7. Weakness or dizziness.  GO DIRECTLY TO THE NEAREST EMERGENCY ROOM IF YOU HAVE ANY OF THE FOLLOWING:      Difficulty breathing              Chills and/or fever over 101 F   Persistent vomiting and/or vomiting blood   Severe abdominal pain   Severe chest pain   Black, tarry stools   Bleeding- more than one tablespoon   Any other symptom or condition that you feel may need urgent attention  Your doctor recommends these additional instructions:  If any biopsies were taken, your doctors clinic will contact you in 1 to 2   weeks with any results.  - Patient has a contact number available for emergencies.  The signs and   symptoms of potential delayed complications were discussed with the   patient.  Return to normal activities tomorrow.  Written discharge   instructions were provided to the patient.   - Discharge patient to home.   - Low sodium diet.   - Continue present medications, carvedilol 6.25 mg twice a day  - Repeat upper endoscopy in 6 months for surveillance and retreatment.  For questions, problems or results please call your physician - Sujata Kong MD at Work:  [phone], Work:  [phone].  [facility] , EMERGENCY ROOM PHONE NUMBER: [phone]  IF A COMPLICATION OR EMERGENCY SITUATION ARISES AND YOU ARE UNABLE TO REACH   YOUR PHYSICIAN - GO DIRECTLY TO THE EMERGENCY ROOM.  MD Sujata Juarez MD  7/5/2023 9:04:11 AM  This report has been verified and signed electronically.  Dear patient,  As a result of recent federal legislation (The Federal Cures Act), you may   receive lab or pathology results from your procedure in your [facility]   account before your physician is able to contact you. Your physician or   their representative will relay the results to you with their   recommendations at their soonest availability.  Thank you,  PROVATION

## 2023-07-05 NOTE — TRANSFER OF CARE
Anesthesia Transfer of Care Note    Patient: Ugo Zimmerman    Procedure(s) Performed: Procedure(s) (LRB):  EGD, WITH BANDING OF VARICES (N/A)    Patient location: GI    Anesthesia Type: general    Transport from OR: Transported from OR on room air with adequate spontaneous ventilation    Post pain: adequate analgesia    Post assessment: no apparent anesthetic complications and tolerated procedure well    Post vital signs: stable    Level of consciousness: awake    Nausea/Vomiting: no nausea/vomiting    Complications: none    Transfer of care protocol was followed

## 2023-07-05 NOTE — H&P
EGD History and Physical    Patient Name: Ugo Zimmerman  MRN: [id number]  : 1965  Date of Procedure:  2023  Referring Physician: Jeffery Mckeon MD  Primary Physician: Navdeep Resendiz MD  Procedure Physician: Sujata Kong MD, MPH     Procedure - EGD  ASA - per anesthesia  Mallampati - per anesthesia  History of Anesthesia problems - no  Family history Anesthesia problems -  no   Plan of anesthesia - General    Diagnosis:  follow-up esophageal varices  Chief Complaint: Same as above    HPI: Patient is an 57 y.o. male is here for the above.     Mr. 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    Patient was admitted to the hospital in 2022 for severe EDWARD.  Known to have HCV cirrhosis and EGD showed large esophageal varices with stigmata.  He reported a several week history of melena preceding.  He has undergone 3 EV bandings, last in may 2022.  His last EGD in 2022 showed decompressed and eradicated varices.  He is here for repeat EGD for monitoring.     He has been doing well without any diarrhea, constipation, rectal bleeding, and melena.  He denies any swelling, abdominal distention, itching, leg swelling, confusion.  He is following a low sodium diet.  He remains on carvedilol 6.25 mg BID.  BP is good. Weight is stable.       Last MELD-Na over a year ago.  Recent CMP with normal TB, creatinine, sodium.  Hgb normal as well. US scheduled for next week.       Last colonoscopy: Last colonoscopy was several years ago in [place].  Does not remember MD or facility.  Was told was fine.   Family history: denies  Anticoagulation: none    ROS:  Constitutional: No fevers, chills, No weight loss  CV: No chest pain  Pulm: No cough, No shortness of breath  GI: see HPI    Medical History:   Past Medical History:   Diagnosis Date    Acute pancreatitis 2018    [facility], [place]    Anemia 2012    Cirrhosis      Diabetes mellitus     Unspecified viral hepatitis C without hepatic coma          Surgical History:   Past Surgical History:   Procedure Laterality Date    CHOLECYSTECTOMY      COLONOSCOPY  2018    EGD, WITH BANDING OF VARICES N/A 05/02/2022    Procedure: EGD, WITH BANDING OF VARICES;  Surgeon: Sujata Kong MD;  Location: [facility] ENDOSCOPY;  Service: Gastroenterology;  Laterality: N/A;    ESOPHAGOGASTRODUODENOSCOPY  02/22/2022    with biopsy and bleeder control    ESOPHAGOGASTRODUODENOSCOPY  03/28/2022    wtih banding    ESOPHAGOGASTRODUODENOSCOPY N/A 06/06/2022    Procedure: EGD (ESOPHAGOGASTRODUODENOSCOPY);  Surgeon: Sujata Kong MD;  Location: [facility] ENDOSCOPY;  Service: Gastroenterology;  Laterality: N/A;    ESOPHAGUS SURGERY  FEBRUARY 2022    [facility]    LIVER BIOPSY  2012    Stage IV cirrhosis    LIVER SURGERY  2012    Biopsy    UPPER GASTROINTESTINAL ENDOSCOPY  Feb 2022    [facility]       Family History:   Family History   Problem Relation Age of Onset    Cancer Mother     Cancer Brother    .    Social History:   Social History     Socioeconomic History    Marital status:    Tobacco Use    Smoking status: Never    Smokeless tobacco: Never    Tobacco comments:     Never smoked   Substance and Sexual Activity    Alcohol use: Never     Comment: Not since 1999    Drug use: Yes     Types: Marijuana     Comment: Prescription cannabis    Sexual activity: Yes     Partners: Female       Review of patient's allergies indicates:  No Known Allergies    Medications:   Medications Prior to Admission   Medication Sig Dispense Refill Last Dose    ALPRAZolam (XANAX) 0.5 MG tablet Take 0.5 mg by mouth 2 (two) times daily as needed.   7/5/2023 at 0700    carvediloL (COREG) 6.25 MG tablet Take 6.25 mg by mouth 2 (two) times daily.   7/5/2023 at 0600    diphenhydrAMINE (BENADRYL) 25 mg capsule Take 25 mg by mouth nightly as needed for Insomnia.   7/4/2023    metFORMIN (GLUCOPHAGE) 1000 MG tablet Take 1,000  "mg by mouth 2 (two) times daily.   7/4/2023         Physical Exam:    Vital Signs:   Vitals:    07/05/23 0752   BP: 129/80   Pulse: 65   Resp: (!) 24   Temp: 97.9 °F (36.6 °C)     /80 (BP Location: Left arm, Patient Position: Lying)   Pulse 65   Temp 97.9 °F (36.6 °C)   Resp (!) 24   Ht 5' 9" (1.753 m)   Wt 77.1 kg (170 lb)   SpO2 (!) 93%   BMI 25.10 kg/m²     General:          Well appearing in no acute distress  Lungs: Clear to auscultation bilaterally, respirations unlabored  Heart: Regular rate and rhythm, S1 and S2 normal, no obvious murmurs  Abdomen:         Soft, non-tender, bowel sounds normal, no masses, no organomegaly        Labs:  Lab Results   Component Value Date    WBC 3.1 02/23/2022    HGB 9.7 02/23/2022    HCT 26.7 02/23/2022    MCV 84.5 02/23/2022     02/23/2022     Lab Results   Component Value Date    INR 1.29 02/20/2022    PTT 31.0 02/20/2022     Lab Results   Component Value Date     02/23/2022    K 3.9 02/23/2022    CO2 22 02/23/2022    BUN 10.5 02/23/2022    CREATININE 0.87 02/23/2022    LABPROT 6.0 02/23/2022    ALBUMIN 3.3 02/23/2022    BILITOT 1.2 02/23/2022    ALKPHOS 68 02/23/2022    ALT 25 02/23/2022    AST 33 02/23/2022       Assessment and Plan:     History reviewed, vital signs satisfactory, cardiopulmonary status satisfactory.  I have explained the sedation options, risks, benefits, and alternatives of this endoscopic procedure to the patient including but not limited to bleeding, inflammation, infection, perforation, and death.  All questions were answered and the patient consented to proceed with procedure as planned.   The patient is deemed an appropriate candidate for the sedation as planned.      Sujata Kong MD, MPH   of Clinical Medicine  Gastroenterology and Hepatology  [facility]    7/5/2023  8:34 AM     "

## 2023-07-05 NOTE — ANESTHESIA POSTPROCEDURE EVALUATION
Anesthesia Post Evaluation    Patient: Ugo Zimmerman    Procedure(s) Performed: Procedure(s) (LRB):  EGD, WITH BANDING OF VARICES (N/A)    Final Anesthesia Type: general      Patient location during evaluation: GI PACU  Patient participation: Yes- Able to Participate  Level of consciousness: awake and alert  Post-procedure vital signs: reviewed and stable  Pain management: adequate  Airway patency: patent    PONV status at discharge: No PONV  Anesthetic complications: no      Cardiovascular status: stable  Respiratory status: spontaneous ventilation and unassisted  Hydration status: euvolemic  Follow-up not needed.

## 2023-07-06 VITALS
OXYGEN SATURATION: 96 % | HEIGHT: 69 IN | TEMPERATURE: 98 F | SYSTOLIC BLOOD PRESSURE: 148 MMHG | DIASTOLIC BLOOD PRESSURE: 88 MMHG | RESPIRATION RATE: 18 BRPM | WEIGHT: 170 LBS | HEART RATE: 69 BPM | BODY MASS INDEX: 25.18 KG/M2

## 2023-07-10 ENCOUNTER — HOSPITAL ENCOUNTER (OUTPATIENT)
Dept: RADIOLOGY | Facility: HOSPITAL | Age: 58
Discharge: HOME | End: 2023-07-10
Attending: STUDENT IN AN ORGANIZED HEALTH CARE EDUCATION/TRAINING PROGRAM
Payer: COMMERCIAL

## 2023-07-10 DIAGNOSIS — B19.20 COMPENSATED HCV CIRRHOSIS: Primary | ICD-10-CM

## 2023-07-10 DIAGNOSIS — K74.69 COMPENSATED HCV CIRRHOSIS: Primary | ICD-10-CM

## 2023-07-10 PROCEDURE — 76705 ECHO EXAM OF ABDOMEN: CPT | Mod: TC

## 2023-07-11 ENCOUNTER — TELEPHONE (OUTPATIENT)
Dept: GASTROENTEROLOGY | Facility: CLINIC | Age: 58
End: 2023-07-11
Payer: COMMERCIAL

## 2023-07-11 NOTE — TELEPHONE ENCOUNTER
----- Message from Lorraine Rivas sent at 7/10/2023  1:39 PM CDT -----  Regarding: Pt wants info changes  Pt called and stated his discharge summary from his US today ordered by Dr. Mckeon states Alcoholic Cirrhosis. Pt stated that is incorrect information and he would like it changed. Pt never considered himself an alcoholic and hasn't drank in many years. Even when he did drink it was a beer from time to time. Pt can be reached @ [phone]           Thank You  Stephani FITZPATRICK

## 2023-07-11 NOTE — TELEPHONE ENCOUNTER
Dr. Mckeon,  See note below. Please amend visit dx to reflect HCV Cirrhosis. I will notify pt once completed. Thanks

## 2023-07-11 NOTE — TELEPHONE ENCOUNTER
Advised pt that dx has been changed. Verbalized understanding.   Referral was faxed on 05/19/2021.  They will set up an appointment with Dr Alberts.

## 2023-07-28 ENCOUNTER — TELEPHONE (OUTPATIENT)
Dept: GASTROENTEROLOGY | Facility: HOSPITAL | Age: 58
End: 2023-07-28
Payer: COMMERCIAL

## 2023-07-28 NOTE — TELEPHONE ENCOUNTER
Spoke with patient regarding abd US results.  I discussed that would recommend triple phase (liver protocol) CT scan of abd/pelvis to review the liver and portal venous system better.  He is currently in [place], [place] for work and will be there until October.  Looks like there is [facility] and [facility] in [place].  Maybe can send orders there? Patient also says he will ask around as well to determine imaging center that can comply with orders.  Once we have CT results, may need to schedule virtual appointment to discuss treatment options.  Thanks.

## 2023-08-01 DIAGNOSIS — R93.5 ABNORMAL ABDOMINAL ULTRASOUND: Primary | ICD-10-CM

## 2023-08-01 DIAGNOSIS — B19.20 COMPENSATED HCV CIRRHOSIS: ICD-10-CM

## 2023-08-01 DIAGNOSIS — I85.10 SECONDARY ESOPHAGEAL VARICES WITHOUT BLEEDING: ICD-10-CM

## 2023-08-01 DIAGNOSIS — K74.69 COMPENSATED HCV CIRRHOSIS: ICD-10-CM

## 2023-08-01 NOTE — TELEPHONE ENCOUNTER
Pt scheduled at [facility] 8/24/23 830.  Spouse advised. Authorized as well.  Faxed orders.

## 2023-09-06 ENCOUNTER — PATIENT MESSAGE (OUTPATIENT)
Dept: GASTROENTEROLOGY | Facility: CLINIC | Age: 58
End: 2023-09-06
Payer: COMMERCIAL

## 2023-09-09 DIAGNOSIS — B19.20 COMPENSATED HCV CIRRHOSIS: Primary | ICD-10-CM

## 2023-09-09 DIAGNOSIS — K74.69 COMPENSATED HCV CIRRHOSIS: Primary | ICD-10-CM

## 2023-09-09 RX ORDER — ENOXAPARIN SODIUM 100 MG/ML
80 INJECTION SUBCUTANEOUS 2 TIMES DAILY
Qty: 48 ML | Refills: 2 | Status: SHIPPED | OUTPATIENT
Start: 2023-09-09 | End: 2023-09-18 | Stop reason: SDUPTHER

## 2023-09-18 DIAGNOSIS — B19.20 COMPENSATED HCV CIRRHOSIS: ICD-10-CM

## 2023-09-18 DIAGNOSIS — K74.69 COMPENSATED HCV CIRRHOSIS: ICD-10-CM

## 2023-09-18 DIAGNOSIS — I81 PORTAL VEIN THROMBOSIS: Primary | ICD-10-CM

## 2023-09-18 RX ORDER — ENOXAPARIN SODIUM 100 MG/ML
80 INJECTION SUBCUTANEOUS 2 TIMES DAILY
Qty: 48 ML | Refills: 2 | Status: SHIPPED | OUTPATIENT
Start: 2023-09-18 | End: 2023-12-17

## 2023-09-21 PROBLEM — B19.20 COMPENSATED HCV CIRRHOSIS: Status: ACTIVE | Noted: 2023-09-21

## 2023-09-21 PROBLEM — K74.69 COMPENSATED HCV CIRRHOSIS: Status: ACTIVE | Noted: 2023-09-21

## 2023-11-09 ENCOUNTER — TELEPHONE (OUTPATIENT)
Dept: GASTROENTEROLOGY | Facility: CLINIC | Age: 58
End: 2023-11-09
Payer: COMMERCIAL

## 2023-11-09 DIAGNOSIS — K74.69 COMPENSATED HCV CIRRHOSIS: ICD-10-CM

## 2023-11-09 DIAGNOSIS — I81 PORTAL VEIN THROMBOSIS: Primary | ICD-10-CM

## 2023-11-09 DIAGNOSIS — B19.20 COMPENSATED HCV CIRRHOSIS: ICD-10-CM

## 2023-11-09 NOTE — TELEPHONE ENCOUNTER
Spoke to Tejinder Pate given to her with vaild dates it was authorized for.  She will contact [facility].    Also, She is questioning when or if we should start his coumadin medication.  He will be laid off from work in Dec.      Please call PT to discuss treatment and plan.  I have cancelled his apt with Dr. Mckeon and placed orders for CT triple phase.

## 2023-11-09 NOTE — TELEPHONE ENCOUNTER
----- Message from Lorraine Rivas sent at 11/6/2023 11:51 AM CST -----  Regarding: Being told CT is not approved  Pt's wife called and stated pt had an US and she was told after it was taken that the CT was not approved. This was done in [place]. Contact # is [phone]          Thank You  Stephani FITZPATRICK

## 2023-11-14 NOTE — TELEPHONE ENCOUNTER
I spoke with patient regarding medications.  Switching from lovenox to eliquis for anticoagulation.  Will need CT scan, labs, f/u in Dec.

## 2023-12-06 ENCOUNTER — HOSPITAL ENCOUNTER (OUTPATIENT)
Dept: RADIOLOGY | Facility: HOSPITAL | Age: 58
Discharge: HOME | End: 2023-12-06
Attending: INTERNAL MEDICINE
Payer: COMMERCIAL

## 2023-12-06 DIAGNOSIS — I81 PORTAL VEIN THROMBOSIS: ICD-10-CM

## 2023-12-06 DIAGNOSIS — K74.69 COMPENSATED HCV CIRRHOSIS: ICD-10-CM

## 2023-12-06 DIAGNOSIS — B19.20 COMPENSATED HCV CIRRHOSIS: ICD-10-CM

## 2023-12-06 LAB
CREAT SERPL-MCNC: 0.81 MG/DL (ref 0.73–1.18)
GFR SERPLBLD CREATININE-BSD FMLA CKD-EPI: >60 MLS/MIN/1.73/M2

## 2023-12-06 PROCEDURE — 25500020 PHARM REV CODE 255: Performed by: INTERNAL MEDICINE

## 2023-12-06 PROCEDURE — 74178 CT ABD&PLV WO CNTR FLWD CNTR: CPT | Mod: TC

## 2023-12-06 PROCEDURE — 82565 ASSAY OF CREATININE: CPT | Performed by: INTERNAL MEDICINE

## 2023-12-06 RX ADMIN — IOHEXOL 100 ML: 350 INJECTION, SOLUTION INTRAVENOUS at 10:12

## 2023-12-07 DIAGNOSIS — K74.69 COMPENSATED HCV CIRRHOSIS: ICD-10-CM

## 2023-12-07 DIAGNOSIS — B19.20 COMPENSATED HCV CIRRHOSIS: ICD-10-CM

## 2023-12-07 DIAGNOSIS — I81 PORTAL VEIN THROMBOSIS: Primary | ICD-10-CM

## 2023-12-07 DIAGNOSIS — R93.5 ABNORMAL ABDOMINAL ULTRASOUND: ICD-10-CM

## 2023-12-19 ENCOUNTER — OFFICE VISIT (OUTPATIENT)
Dept: GASTROENTEROLOGY | Facility: CLINIC | Age: 58
End: 2023-12-19
Payer: COMMERCIAL

## 2023-12-19 VITALS
BODY MASS INDEX: 27.11 KG/M2 | HEIGHT: 69 IN | HEART RATE: 75 BPM | WEIGHT: 183 LBS | SYSTOLIC BLOOD PRESSURE: 110 MMHG | DIASTOLIC BLOOD PRESSURE: 69 MMHG | OXYGEN SATURATION: 97 % | RESPIRATION RATE: 16 BRPM | TEMPERATURE: 98 F

## 2023-12-19 DIAGNOSIS — B19.20 COMPENSATED HCV CIRRHOSIS: Primary | ICD-10-CM

## 2023-12-19 DIAGNOSIS — I85.10 SECONDARY ESOPHAGEAL VARICES WITHOUT BLEEDING: ICD-10-CM

## 2023-12-19 DIAGNOSIS — I81 PORTAL VEIN THROMBOSIS: ICD-10-CM

## 2023-12-19 DIAGNOSIS — K74.69 COMPENSATED HCV CIRRHOSIS: Primary | ICD-10-CM

## 2023-12-19 LAB
AFP-TM SERPL-MCNC: <2 NG/ML
ALBUMIN SERPL-MCNC: 4.1 G/DL (ref 3.5–5)
ALBUMIN/GLOB SERPL: 1.1 RATIO (ref 1.1–2)
ALP SERPL-CCNC: 78 UNIT/L (ref 40–150)
ALT SERPL-CCNC: 29 UNIT/L (ref 0–55)
AST SERPL-CCNC: 28 UNIT/L (ref 5–34)
BASOPHILS # BLD AUTO: 0.02 X10(3)/MCL
BASOPHILS NFR BLD AUTO: 0.7 %
BILIRUB SERPL-MCNC: 0.9 MG/DL
BUN SERPL-MCNC: 11.4 MG/DL (ref 8.4–25.7)
CALCIUM SERPL-MCNC: 9.7 MG/DL (ref 8.4–10.2)
CHLORIDE SERPL-SCNC: 107 MMOL/L (ref 98–107)
CO2 SERPL-SCNC: 22 MMOL/L (ref 22–29)
CREAT SERPL-MCNC: 0.77 MG/DL (ref 0.73–1.18)
EOSINOPHIL # BLD AUTO: 0.1 X10(3)/MCL (ref 0–0.9)
EOSINOPHIL NFR BLD AUTO: 3.5 %
ERYTHROCYTE [DISTWIDTH] IN BLOOD BY AUTOMATED COUNT: 13.6 % (ref 11.5–17)
GFR SERPLBLD CREATININE-BSD FMLA CKD-EPI: >60 MLS/MIN/1.73/M2
GLOBULIN SER-MCNC: 3.8 GM/DL (ref 2.4–3.5)
GLUCOSE SERPL-MCNC: 161 MG/DL (ref 74–100)
HAV AB SER QL IA: REACTIVE
HBV SURFACE AB SER-ACNC: 5.8 MIU/ML
HBV SURFACE AB SERPL IA-ACNC: NONREACTIVE M[IU]/ML
HCT VFR BLD AUTO: 43.8 % (ref 42–52)
HGB BLD-MCNC: 14.7 G/DL (ref 14–18)
IMM GRANULOCYTES # BLD AUTO: 0.01 X10(3)/MCL (ref 0–0.04)
IMM GRANULOCYTES NFR BLD AUTO: 0.4 %
INR PPP: 1.6
LYMPHOCYTES # BLD AUTO: 0.45 X10(3)/MCL (ref 0.6–4.6)
LYMPHOCYTES NFR BLD AUTO: 15.8 %
MCH RBC QN AUTO: 27 PG (ref 27–31)
MCHC RBC AUTO-ENTMCNC: 33.6 G/DL (ref 33–36)
MCV RBC AUTO: 80.4 FL (ref 80–94)
MONOCYTES # BLD AUTO: 0.39 X10(3)/MCL (ref 0.1–1.3)
MONOCYTES NFR BLD AUTO: 13.7 %
NEUTROPHILS # BLD AUTO: 1.87 X10(3)/MCL (ref 2.1–9.2)
NEUTROPHILS NFR BLD AUTO: 65.9 %
NRBC BLD AUTO-RTO: 0 %
PLATELET # BLD AUTO: 90 X10(3)/MCL (ref 130–400)
PMV BLD AUTO: 9 FL (ref 7.4–10.4)
POTASSIUM SERPL-SCNC: 4 MMOL/L (ref 3.5–5.1)
PROT SERPL-MCNC: 7.9 GM/DL (ref 6.4–8.3)
PROTHROMBIN TIME: 18.4 SECONDS (ref 11.4–14)
RBC # BLD AUTO: 5.45 X10(6)/MCL (ref 4.7–6.1)
SODIUM SERPL-SCNC: 138 MMOL/L (ref 136–145)
WBC # SPEC AUTO: 2.84 X10(3)/MCL (ref 4.5–11.5)

## 2023-12-19 PROCEDURE — 85610 PROTHROMBIN TIME: CPT

## 2023-12-19 PROCEDURE — 86708 HEPATITIS A ANTIBODY: CPT

## 2023-12-19 PROCEDURE — 86706 HEP B SURFACE ANTIBODY: CPT

## 2023-12-19 PROCEDURE — 80053 COMPREHEN METABOLIC PANEL: CPT

## 2023-12-19 PROCEDURE — 82105 ALPHA-FETOPROTEIN SERUM: CPT

## 2023-12-19 PROCEDURE — 99214 OFFICE O/P EST MOD 30 MIN: CPT | Mod: PBBFAC

## 2023-12-19 PROCEDURE — 85025 COMPLETE CBC W/AUTO DIFF WBC: CPT

## 2023-12-19 PROCEDURE — 86787 VARICELLA-ZOSTER ANTIBODY: CPT

## 2023-12-19 PROCEDURE — 36415 COLL VENOUS BLD VENIPUNCTURE: CPT

## 2023-12-19 RX ORDER — CARVEDILOL 6.25 MG/1
6.25 TABLET ORAL 2 TIMES DAILY
Qty: 60 TABLET | Refills: 11 | Status: SHIPPED | OUTPATIENT
Start: 2023-12-19 | End: 2024-04-02 | Stop reason: SDUPTHER

## 2023-12-19 NOTE — PROGRESS NOTES
Cirrhosis       Established Patient Note         TODAY'S VISIT DATE:  12/19/2023  The patient's last visit with me was on Visit date not found.     PCP: Navdeep Resendiz      Referring MD:   No ref. provider found    History of Present Illness:    Mr. Zimmerman is a 58 year old with HCV cirrhosis complicated by bleeding esophageal varices s/p banding and PVT here for follow-up.     Patient was admitted to the hospital in February 2022 for severe EDWARD.  Known to have HCV cirrhosis and EGD showed large esophageal varices with stigmata.  He reported a several week history of melena preceding.  He has undergone 4 EV bandings, last in July 2023.  His last EGD in July 2023 showed three columns of small, grade II varices s/p banding x 3, moderate PHG.  He was to [facility]-up for repeat banding but relocated for work.      Abd US in July 2023 showed cirrhosis with nonocclusive PVT.  He has a triple phase CT scan in September 2023 in [place] showed extensive portal vein, splenic vein and SMV thormbosis.  I started him on therapeutic lovenox and transitioned to Eliquis.  Repeat CT scan on 12/2023 showed improving thrombosis with some residual clot at the portal confluence.      Today...    Doing well, no acute complaints. Reports some abdominal pain associated with anticoagulants, however, that resolved around mid October. Initially on Lovenox, and transitioned to Eliquis. Denies any melena/hematochezia, hematemesis. Recently had triphasic CT scan to evaluate multiple issues, notably HCC and extensive thromboses, which in the interval, have improved. Other wise, reports improvement in compliancy with low salt diet. Denies any alcohol induced cirrhosis, likely all 2/2 HCV.      Last MELD-Na over a year ago.  Recent CMP with normal TB, creatinine, sodium.  Hgb normal as well. US scheduled for next week.         Last colonoscopy: Last colonoscopy was several years ago in [place].  Does not remember  MD or facility.  Was told was fine        Background:  Alcohol:   IVDU:   Cocaine:  THC:  Tattoos:    Tobacco: nonsmoker    Liver disease: HCV    MELD-Na:   Child-Chaudhry Class:     Transplant: not under evaluation    Cirrhosis is decompensated with:    Ascites:     No   Spontaneous bacterial peritonitis:  No  Hepatic Encephalopathy:   No  Hepatocellular carcinoma:   No  Hepatorenal syndrome:   No  Hyponatremia:    No  Muscle wasting:    No  Portal vein thrombosis:   Yes        Medical/Surgical History:   Past Medical History:   Diagnosis Date    Acute pancreatitis 2018    [facility], [place]    Anemia 2012    Cirrhosis 2012    Diabetes mellitus     Portal vein thrombosis     Unspecified viral hepatitis C without hepatic coma      Past Surgical History:   Procedure Laterality Date    CHOLECYSTECTOMY      COLONOSCOPY  2018    EGD, WITH BANDING OF VARICES N/A 05/02/2022    Procedure: EGD, WITH BANDING OF VARICES;  Surgeon: Sujata Kong MD;  Location: [facility] ENDOSCOPY;  Service: Gastroenterology;  Laterality: N/A;    EGD, WITH BANDING OF VARICES N/A 7/5/2023    Procedure: EGD, WITH BANDING OF VARICES;  Surgeon: Sujata Kong MD;  Location: [facility] ENDOSCOPY;  Service: Gastroenterology;  Laterality: N/A;    ESOPHAGOGASTRODUODENOSCOPY  02/22/2022    with biopsy and bleeder control    ESOPHAGOGASTRODUODENOSCOPY  03/28/2022    wtih banding    ESOPHAGOGASTRODUODENOSCOPY N/A 06/06/2022    Procedure: EGD (ESOPHAGOGASTRODUODENOSCOPY);  Surgeon: Sujata Kong MD;  Location: [facility] ENDOSCOPY;  Service: Gastroenterology;  Laterality: N/A;    ESOPHAGUS SURGERY  FEBRUARY 2022    [facility]    LIVER BIOPSY  2012    Stage IV cirrhosis    LIVER SURGERY  2012    Biopsy    UPPER GASTROINTESTINAL ENDOSCOPY  Feb 2022    [facility]         Family History:   Family History   Problem Relation Age of Onset    Cancer Mother     Cancer Brother         Social History:   Social History     Socioeconomic History    Marital  "status:    Tobacco Use    Smoking status: Never    Smokeless tobacco: Never    Tobacco comments:     Never smoked   Substance and Sexual Activity    Alcohol use: Never     Comment: Not since 1999    Drug use: Yes     Types: Marijuana     Comment: Prescription cannabis    Sexual activity: Yes     Partners: Female        Review of patient's allergies indicates:  No Known Allergies    Current Medications:   Outpatient Medications Marked as Taking for the 12/19/23 encounter (Office Visit) with RESIDENT, [facility] GASTROENTEROLOGY   Medication Sig Dispense Refill    ALPRAZolam (XANAX) 0.5 MG tablet Take 0.5 mg by mouth 2 (two) times daily as needed.      diphenhydrAMINE (BENADRYL) 25 mg capsule Take 25 mg by mouth nightly as needed for Insomnia.      metFORMIN (GLUCOPHAGE) 1000 MG tablet Take 1,000 mg by mouth 2 (two) times daily.      [DISCONTINUED] apixaban (ELIQUIS) 5 mg Tab Take 2 tablets (10 mg total) by mouth 2 (two) times daily for 7 days, THEN 1 tablet (5 mg total) 2 (two) times daily. 88 tablet 5    [DISCONTINUED] carvediloL (COREG) 6.25 MG tablet Take 6.25 mg by mouth 2 (two) times daily.          Review of Systems   Constitutional:  Negative for unexpected weight change.   Gastrointestinal:  Negative for abdominal pain, blood in stool, change in bowel habit, constipation, diarrhea, nausea, vomiting and reflux.       Vital Signs:  /69 (BP Location: Left arm, Patient Position: Sitting, BP Method: Medium (Automatic))   Pulse 75   Temp 97.6 °F (36.4 °C) (Oral)   Resp 16   Ht 5' 9" (1.753 m)   Wt 83 kg (183 lb)   SpO2 97%   BMI 27.02 kg/m²      Physical Exam  Constitutional:       General: He is not in acute distress.     Appearance: Normal appearance. He is not ill-appearing or toxic-appearing.   Eyes:      Extraocular Movements: Extraocular movements intact.   Cardiovascular:      Rate and Rhythm: Normal rate and regular rhythm.      Pulses: Normal pulses.      Heart sounds: Normal heart sounds. No " murmur heard.     No friction rub. No gallop.   Pulmonary:      Effort: Pulmonary effort is normal. No respiratory distress.      Breath sounds: Normal breath sounds. No stridor. No wheezing.   Abdominal:      General: There is no distension.      Palpations: Abdomen is soft. There is no mass.      Tenderness: There is no abdominal tenderness.      Hernia: No hernia is present.      Comments: Left liver lobe extends 1/3 down to the umbilicus   Musculoskeletal:         General: Normal range of motion.      Right lower leg: No edema.      Left lower leg: No edema.   Skin:     General: Skin is warm and dry.      Coloration: Skin is not jaundiced or pale.      Findings: No bruising or rash.   Neurological:      General: No focal deficit present.      Mental Status: He is alert and oriented to person, place, and time.   Psychiatric:         Mood and Affect: Mood normal.         Behavior: Behavior normal.         Thought Content: Thought content normal.         Judgment: Judgment normal.         Labs: Reviewed      WBC   Date Value Ref Range Status   12/19/2023 2.84 (L) 4.50 - 11.50 x10(3)/mcL Final     Hgb   Date Value Ref Range Status   12/19/2023 14.7 14.0 - 18.0 g/dL Final     Hct   Date Value Ref Range Status   12/19/2023 43.8 42.0 - 52.0 % Final     Platelet   Date Value Ref Range Status   12/19/2023 90 (L) 130 - 400 x10(3)/mcL Final     MCV   Date Value Ref Range Status   12/19/2023 80.4 80.0 - 94.0 fL Final       Sodium Level   Date Value Ref Range Status   02/23/2022 135 136 - 145      Potassium Level   Date Value Ref Range Status   02/23/2022 3.9 3.5 - 5.1      Carbon Dioxide   Date Value Ref Range Status   02/23/2022 22 22 - 29      Blood Urea Nitrogen   Date Value Ref Range Status   02/23/2022 10.5 8.4 - 25.7      Creatinine   Date Value Ref Range Status   12/06/2023 0.81 0.73 - 1.18 mg/dL Final     Bilirubin Total   Date Value Ref Range Status   02/23/2022 1.2 <=1.5      Bilirubin Direct   Date Value Ref  "Range Status   02/23/2022 0.5 0.0 - 0.5      Bilirubin Indirect   Date Value Ref Range Status   02/23/2022 0.70 0.00 - 0.80      Alkaline Phosphatase   Date Value Ref Range Status   02/23/2022 68 40 - 150      Aspartate Aminotransferase   Date Value Ref Range Status   02/23/2022 33 5 - 34      Alanine Aminotransferase   Date Value Ref Range Status   02/23/2022 25 0 - 55      Protein Total   Date Value Ref Range Status   02/23/2022 6.0 6.4 - 8.3      Albumin Level   Date Value Ref Range Status   02/23/2022 3.3 3.5 - 5.0        PT   Date Value Ref Range Status   02/20/2022 15.9 11.9 - 14.4      INR   Date Value Ref Range Status   02/20/2022 1.29 0.90 - 1.20        No results found for: "AFP"         Assessment/Plan:    Problem List Items Addressed This Visit          GI    Esophageal varices without bleeding     EV bleeding in Feb 2022  S/p multiple bandings  Last EGD July 2023: three columns of grade II, small varices s/p banding x 3  Needs repeat EGD to assess for additional treatment, ideally when off anticoagulation         Relevant Medications    carvediloL (COREG) 6.25 MG tablet    Compensated HCV cirrhosis - Primary       SCREENING:    Last EGD:   July 2023: three columns of grade II, small varices s/p banding x 3  Needs repeat EGD to assess for additional treatment, ideally when off anticoagulation    Last imaging study:    Dec 2023 CT a/p: no liver lesion, thrombosis as described          CIRRHOSIS COUNSELING:    - strict abstinence of alcohol use  - low sodium (salt) 2000mg per day  - Nutrition: 25-30kcal (calorie per body weight in kilogram) per day  - No need to restrict protein in diet  - High protein diet: 1.2-1.5 gram/kg (protein per body weight in kg) per day to prevent muscle mass loss  - Resistance exercises for muscle strength  - Avoid raw seafoods due to risk of fatal Vibrio vulnificus infection  - Avoid Non-steroidal anti-inflammatory drugs (NSAIDs) such as ibuprofen, Motrin, naproxen, Aleve due " to risk of kidney damage  - Can take acetaminophen (tylenol), no more than 2000mg per day  - Compliance with all medications  - Ultrasound or MRI of the liver every 6 months for liver cancer screening  - Upper endoscopy every 1 year for varices         Relevant Orders    Hepatitis A antibody, IgG    Hepatitis B Surface Ab, Qualitative    AFP Tumor Marker    Protime-INR    Comprehensive Metabolic Panel    CBC Auto Differential (Completed)    Varicella Zoster Antibody, IgG    Portal vein thrombosis     Diagnosed in July 2023.   Extensive thrombosis involving the portal vein, splenic vein, and SMV  No HCC on triple phase  Started on therapeutic lovenox with transition to Eliquis  Dec 2023 with improving thrombosis with residual clot at the portal confluence still involving the PV, SV, and SMV  Continue Eliquis 5 mg BID  Repeat CT with contrast in 3 months         Relevant Medications    apixaban (ELIQUIS) 5 mg Tab         -Continue taking Eliquis 5mg BID until next CT scan w/ contrast in 3 months  -Will obtain labs today to update current MELD score  -At this time, superior mesenteric vein thrombus and splenic vein thrombus still present, but have improved significantly. Mild mural thrombus in portal vein, splenic vein, and superior mesenteric vein.   -Will hold off on EGD at this time due to necessity of treating thrombus first. He did undergo a few banding procedures, which makes him lower risk for bleeds at this time. Will re-evaluate necessity for EGD when thrombi have been treated and can stop the Eliquis.  -Recommend obtaining vaccines, most importantly Hep A/Hep B, but all vaccinations are recommended  -Continue Coreg 6.25 mg BID for variceal prophylaxis      HEALTH MAINTENANCE:     Vaccinations:    Influenza (inactive):  recommended annually   PCV 20 (Prevnar): recommended  Tetanus (TdaP): recommended every 10 years  HPV (males and females ages 11-44 yo): N/A  Meningococcal:  No risk factors   Hepatitis B: will  check immunity     Hepatitis A:  will check immunity     MMR (live vaccine): UTD          Chickenpox status/Varicella (live vaccine):  will check immunity     Shingrix: recommended   COVID-19: recommend      Colon cancer risk:    Colonoscopy:      DEXA scan:       Follow up: 3 months    Juan Honeycutt DO  [employer] [facility], PGY-II

## 2023-12-19 NOTE — PROGRESS NOTES
Cirrhosis       Established Patient Note         TODAY'S VISIT DATE:  12/19/2023  The patient's last visit with me was on Visit date not found.     PCP: Navdeep Resendiz      Referring MD:   No ref. provider found    History of Present Illness:    Mr. Zimmerman is a 58 year old with HCV cirrhosis complicated by bleeding esophageal varices s/p banding and PVT here for follow-up.     Patient was admitted to the hospital in February 2022 for severe EDWARD.  Known to have HCV cirrhosis and EGD showed large esophageal varices with stigmata.  He reported a several week history of melena preceding.  He has undergone 4 EV bandings, last in July 2023.  His last EGD in July 2023 showed three columns of small, grade II varices s/p banding x 3, moderate PHG.  He was to [facility]-up for repeat banding but relocated for work.      Abd US in July 2023 showed cirrhosis with nonocclusive PVT.  He has a triple phase CT scan in September 2023 in [place] showed extensive portal vein, splenic vein and SMV thormbosis.  I started him on therapeutic lovenox and transitioned to Eliquis.  Repeat CT scan on 12/2023 showed improving thrombosis with some residual clot at the portal confluence.      Today...    He has been doing well without any diarrhea, constipation, rectal bleeding, and melena.  He denies any swelling, abdominal distention, itching, leg swelling, confusion.  He is following a low sodium diet.  He remains on carvedilol 6.25 mg BID.  BP is good. Weight is stable.        Last MELD-Na over a year ago.  Recent CMP with normal TB, creatinine, sodium.  Hgb normal as well. US scheduled for next week.         Last colonoscopy: Last colonoscopy was several years ago in [place].  Does not remember MD or facility.  Was told was fine        Background:  Alcohol:   IVDU:   Cocaine:  THC:  Tattoos:    Tobacco: nonsmoker    Liver disease: HCV    MELD-Na:   Child-Chaudhry Class:     Transplant: not under  evaluation    Cirrhosis is decompensated with:    Ascites:     No   Spontaneous bacterial peritonitis:  No  Hepatic Encephalopathy:   No  Hepatocellular carcinoma:   No  Hepatorenal syndrome:   No  Hyponatremia:    No  Muscle wasting:    No  Portal vein thrombosis:   Yes        Medical/Surgical History:   Past Medical History:   Diagnosis Date    Acute pancreatitis 2018    [facility], [place]    Anemia 2012    Cirrhosis 2012    Diabetes mellitus     Unspecified viral hepatitis C without hepatic coma      Past Surgical History:   Procedure Laterality Date    CHOLECYSTECTOMY      COLONOSCOPY  2018    EGD, WITH BANDING OF VARICES N/A 05/02/2022    Procedure: EGD, WITH BANDING OF VARICES;  Surgeon: Sujata Kong MD;  Location: [facility] ENDOSCOPY;  Service: Gastroenterology;  Laterality: N/A;    EGD, WITH BANDING OF VARICES N/A 7/5/2023    Procedure: EGD, WITH BANDING OF VARICES;  Surgeon: Sujata Kong MD;  Location: [facility] ENDOSCOPY;  Service: Gastroenterology;  Laterality: N/A;    ESOPHAGOGASTRODUODENOSCOPY  02/22/2022    with biopsy and bleeder control    ESOPHAGOGASTRODUODENOSCOPY  03/28/2022    wtih banding    ESOPHAGOGASTRODUODENOSCOPY N/A 06/06/2022    Procedure: EGD (ESOPHAGOGASTRODUODENOSCOPY);  Surgeon: Sujata Kong MD;  Location: [facility] ENDOSCOPY;  Service: Gastroenterology;  Laterality: N/A;    ESOPHAGUS SURGERY  FEBRUARY 2022    [facility]    LIVER BIOPSY  2012    Stage IV cirrhosis    LIVER SURGERY  2012    Biopsy    UPPER GASTROINTESTINAL ENDOSCOPY  Feb 2022    [facility]         Family History:   Family History   Problem Relation Age of Onset    Cancer Mother     Cancer Brother         Social History:   Social History     Socioeconomic History    Marital status:    Tobacco Use    Smoking status: Never    Smokeless tobacco: Never    Tobacco comments:     Never smoked   Substance and Sexual Activity    Alcohol use: Never     Comment: Not since 1999    Drug use: Yes      Types: Marijuana     Comment: Prescription cannabis    Sexual activity: Yes     Partners: Female        Review of patient's allergies indicates:  No Known Allergies    Current Medications:   No outpatient medications have been marked as taking for the 12/19/23 encounter (Appointment) with RESIDENT, [facility] GASTROENTEROLOGY.        Review of Systems    Vital Signs:  There were no vitals taken for this visit.     Physical Exam    Labs: Reviewed      WBC   Date Value Ref Range Status   02/23/2022 3.1 4.5 - 11.0      Hgb   Date Value Ref Range Status   02/23/2022 9.7 13.5 - 17.5      Hct   Date Value Ref Range Status   02/23/2022 26.7 40.0 - 51.0      Platelet   Date Value Ref Range Status   02/23/2022 120 130 - 400      MCV   Date Value Ref Range Status   02/23/2022 84.5 80.0 - 100.0        Sodium Level   Date Value Ref Range Status   02/23/2022 135 136 - 145      Potassium Level   Date Value Ref Range Status   02/23/2022 3.9 3.5 - 5.1      Carbon Dioxide   Date Value Ref Range Status   02/23/2022 22 22 - 29      Blood Urea Nitrogen   Date Value Ref Range Status   02/23/2022 10.5 8.4 - 25.7      Creatinine   Date Value Ref Range Status   12/06/2023 0.81 0.73 - 1.18 mg/dL Final     Bilirubin Total   Date Value Ref Range Status   02/23/2022 1.2 <=1.5      Bilirubin Direct   Date Value Ref Range Status   02/23/2022 0.5 0.0 - 0.5      Bilirubin Indirect   Date Value Ref Range Status   02/23/2022 0.70 0.00 - 0.80      Alkaline Phosphatase   Date Value Ref Range Status   02/23/2022 68 40 - 150      Aspartate Aminotransferase   Date Value Ref Range Status   02/23/2022 33 5 - 34      Alanine Aminotransferase   Date Value Ref Range Status   02/23/2022 25 0 - 55      Protein Total   Date Value Ref Range Status   02/23/2022 6.0 6.4 - 8.3      Albumin Level   Date Value Ref Range Status   02/23/2022 3.3 3.5 - 5.0        PT   Date Value Ref Range Status   02/20/2022 15.9 11.9 - 14.4      INR   Date Value Ref Range Status   02/20/2022  "1.29 0.90 - 1.20        No results found for: "AFP"         Assessment/Plan:    Problem List Items Addressed This Visit          GI    Esophageal varices without bleeding     EV bleeding in Feb 2022  S/p multiple bandings  Last EGD July 2023: three columns of grade II, small varices s/p banding x 3  Needs repeat EGD to assess for additional treatment, ideally when off anticoagulation         Compensated HCV cirrhosis - Primary       SCREENING:    Last EGD:   July 2023: three columns of grade II, small varices s/p banding x 3  Needs repeat EGD to assess for additional treatment, ideally when off anticoagulation    Last imaging study:    Dec 2023 CT a/p: no liver lesion, thrombosis as described          CIRRHOSIS COUNSELING:    - strict abstinence of alcohol use  - low sodium (salt) 2000mg per day  - Nutrition: 25-30kcal (calorie per body weight in kilogram) per day  - No need to restrict protein in diet  - High protein diet: 1.2-1.5 gram/kg (protein per body weight in kg) per day to prevent muscle mass loss  - Resistance exercises for muscle strength  - Avoid raw seafoods due to risk of fatal Vibrio vulnificus infection  - Avoid Non-steroidal anti-inflammatory drugs (NSAIDs) such as ibuprofen, Motrin, naproxen, Aleve due to risk of kidney damage  - Can take acetaminophen (tylenol), no more than 2000mg per day  - Compliance with all medications  - Ultrasound or MRI of the liver every 6 months for liver cancer screening  - Upper endoscopy every 1 year for varices         Portal vein thrombosis     Diagnosed in July 2023.   Extensive thrombosis involving the portal vein, splenic vein, and SMV  No HCC on triple phase  Started on therapeutic lovenox with transition to Eliquis  Dec 2023 with improving thrombosis with residual clot at the portal confluence still involving the PV, SV, and SMV  Continue Eliquis 5 mg BID  Repeat CT with contrast in 3 months                    HEALTH MAINTENANCE:     Vaccinations:    Influenza " (inactive):  recommended annually   PCV 20 (Prevnar): recommended  Tetanus (TdaP): recommended every 10 years  HPV (males and females ages 11-44 yo): N/A  Meningococcal:  No risk factors   Hepatitis B: will check immunity     Hepatitis A:  will check immunity     MMR (live vaccine): UTD          Chickenpox status/Varicella (live vaccine):  will check immunity     Shingrix: recommended   COVID-19: recommend      Colon cancer risk:    Colonoscopy:      DEXA scan:       Follow up:

## 2023-12-19 NOTE — ASSESSMENT & PLAN NOTE
Diagnosed in July 2023.   Extensive thrombosis involving the portal vein, splenic vein, and SMV  No HCC on triple phase  Started on therapeutic lovenox with transition to Eliquis  Dec 2023 with improving thrombosis with residual clot at the portal confluence still involving the PV, SV, and SMV  Continue Eliquis 5 mg BID  Repeat CT with contrast in 3 months

## 2023-12-19 NOTE — ASSESSMENT & PLAN NOTE
EV bleeding in Feb 2022  S/p multiple bandings  Last EGD July 2023: three columns of grade II, small varices s/p banding x 3  Needs repeat EGD to assess for additional treatment, ideally when off anticoagulation

## 2023-12-21 LAB
VZV IGG SER IA-ACNC: 2.5
VZV IGG SER QL IA: POSITIVE

## 2024-01-25 ENCOUNTER — TELEPHONE (OUTPATIENT)
Dept: GASTROENTEROLOGY | Facility: CLINIC | Age: 59
End: 2024-01-25
Payer: COMMERCIAL

## 2024-01-25 NOTE — TELEPHONE ENCOUNTER
Verbally called in RX to [facility] pharmacy for pricing.  PT will get it for $38.40.

## 2024-01-25 NOTE — TELEPHONE ENCOUNTER
----- Message from Shanice Dickey sent at 1/25/2024 10:33 AM CST -----  Regarding: ELIQUIS cost  Pt called because he is on Eliquis and he can't afford. The cost is almost 1,000 with no insurance . Please advise  Pt [phone]

## 2024-03-04 DIAGNOSIS — I81 PORTAL VEIN THROMBOSIS: ICD-10-CM

## 2024-04-02 DIAGNOSIS — I85.10 SECONDARY ESOPHAGEAL VARICES WITHOUT BLEEDING: ICD-10-CM

## 2024-04-02 DIAGNOSIS — I81 PORTAL VEIN THROMBOSIS: ICD-10-CM

## 2024-04-02 RX ORDER — CARVEDILOL 6.25 MG/1
6.25 TABLET ORAL 2 TIMES DAILY
Qty: 60 TABLET | Refills: 11 | Status: SHIPPED | OUTPATIENT
Start: 2024-04-02 | End: 2025-04-02

## 2024-05-01 ENCOUNTER — HOSPITAL ENCOUNTER (OUTPATIENT)
Dept: RADIOLOGY | Facility: HOSPITAL | Age: 59
Discharge: HOME | End: 2024-05-01
Attending: INTERNAL MEDICINE
Payer: COMMERCIAL

## 2024-05-01 DIAGNOSIS — I81 PORTAL VEIN THROMBOSIS: ICD-10-CM

## 2024-05-01 LAB
CREAT SERPL-MCNC: 0.6 MG/DL (ref 0.73–1.18)
GFR SERPLBLD CREATININE-BSD FMLA CKD-EPI: >60 MLS/MIN/1.73/M2

## 2024-05-01 PROCEDURE — 74160 CT ABDOMEN W/CONTRAST: CPT | Mod: TC

## 2024-05-01 PROCEDURE — 82565 ASSAY OF CREATININE: CPT | Performed by: INTERNAL MEDICINE

## 2024-05-01 PROCEDURE — 25500020 PHARM REV CODE 255: Performed by: INTERNAL MEDICINE

## 2024-05-01 RX ADMIN — IOHEXOL 100 ML: 350 INJECTION, SOLUTION INTRAVENOUS at 11:05

## 2024-05-02 ENCOUNTER — PATIENT MESSAGE (OUTPATIENT)
Dept: GASTROENTEROLOGY | Facility: CLINIC | Age: 59
End: 2024-05-02
Payer: COMMERCIAL

## 2024-05-06 ENCOUNTER — PATIENT MESSAGE (OUTPATIENT)
Dept: GASTROENTEROLOGY | Facility: CLINIC | Age: 59
End: 2024-05-06
Payer: COMMERCIAL

## 2024-05-21 ENCOUNTER — OFFICE VISIT (OUTPATIENT)
Dept: GASTROENTEROLOGY | Facility: CLINIC | Age: 59
End: 2024-05-21
Payer: COMMERCIAL

## 2024-05-21 VITALS
TEMPERATURE: 98 F | WEIGHT: 177.5 LBS | OXYGEN SATURATION: 98 % | RESPIRATION RATE: 16 BRPM | BODY MASS INDEX: 26.29 KG/M2 | HEART RATE: 77 BPM | HEIGHT: 69 IN | DIASTOLIC BLOOD PRESSURE: 70 MMHG | SYSTOLIC BLOOD PRESSURE: 108 MMHG

## 2024-05-21 DIAGNOSIS — I85.10 SECONDARY ESOPHAGEAL VARICES WITHOUT BLEEDING: ICD-10-CM

## 2024-05-21 DIAGNOSIS — B19.20 COMPENSATED HCV CIRRHOSIS: Primary | ICD-10-CM

## 2024-05-21 DIAGNOSIS — I81 PORTAL VEIN THROMBOSIS: ICD-10-CM

## 2024-05-21 DIAGNOSIS — K74.69 COMPENSATED HCV CIRRHOSIS: Primary | ICD-10-CM

## 2024-05-21 LAB
AFP-TM SERPL-MCNC: <2 NG/ML
ALBUMIN SERPL-MCNC: 4.2 G/DL (ref 3.5–5)
ALBUMIN/GLOB SERPL: 1.1 RATIO (ref 1.1–2)
ALP SERPL-CCNC: 68 UNIT/L (ref 40–150)
ALT SERPL-CCNC: 28 UNIT/L (ref 0–55)
ANION GAP SERPL CALC-SCNC: 9 MEQ/L
AST SERPL-CCNC: 27 UNIT/L (ref 5–34)
BASOPHILS # BLD AUTO: 0.02 X10(3)/MCL
BASOPHILS NFR BLD AUTO: 0.7 %
BILIRUB SERPL-MCNC: 1.2 MG/DL
BUN SERPL-MCNC: 12.4 MG/DL (ref 8.4–25.7)
CALCIUM SERPL-MCNC: 9.9 MG/DL (ref 8.4–10.2)
CHLORIDE SERPL-SCNC: 106 MMOL/L (ref 98–107)
CO2 SERPL-SCNC: 22 MMOL/L (ref 22–29)
CREAT SERPL-MCNC: 0.83 MG/DL (ref 0.73–1.18)
CREAT/UREA NIT SERPL: 15
EOSINOPHIL # BLD AUTO: 0.08 X10(3)/MCL (ref 0–0.9)
EOSINOPHIL NFR BLD AUTO: 2.7 %
ERYTHROCYTE [DISTWIDTH] IN BLOOD BY AUTOMATED COUNT: 13.2 % (ref 11.5–17)
GFR SERPLBLD CREATININE-BSD FMLA CKD-EPI: >60 ML/MIN/1.73/M2
GLOBULIN SER-MCNC: 3.7 GM/DL (ref 2.4–3.5)
GLUCOSE SERPL-MCNC: 124 MG/DL (ref 74–100)
HCT VFR BLD AUTO: 42.3 % (ref 42–52)
HGB BLD-MCNC: 14.6 G/DL (ref 14–18)
IMM GRANULOCYTES # BLD AUTO: 0.01 X10(3)/MCL (ref 0–0.04)
IMM GRANULOCYTES NFR BLD AUTO: 0.3 %
INR PPP: 1.4
LYMPHOCYTES # BLD AUTO: 0.57 X10(3)/MCL (ref 0.6–4.6)
LYMPHOCYTES NFR BLD AUTO: 19.6 %
MCH RBC QN AUTO: 27.7 PG (ref 27–31)
MCHC RBC AUTO-ENTMCNC: 34.5 G/DL (ref 33–36)
MCV RBC AUTO: 80.3 FL (ref 80–94)
MONOCYTES # BLD AUTO: 0.38 X10(3)/MCL (ref 0.1–1.3)
MONOCYTES NFR BLD AUTO: 13.1 %
NEUTROPHILS # BLD AUTO: 1.85 X10(3)/MCL (ref 2.1–9.2)
NEUTROPHILS NFR BLD AUTO: 63.6 %
NRBC BLD AUTO-RTO: 0 %
PLATELET # BLD AUTO: 91 X10(3)/MCL (ref 130–400)
PMV BLD AUTO: 9.2 FL (ref 7.4–10.4)
POTASSIUM SERPL-SCNC: 3.8 MMOL/L (ref 3.5–5.1)
PROT SERPL-MCNC: 7.9 GM/DL (ref 6.4–8.3)
PROTHROMBIN TIME: 17.7 SECONDS (ref 11.4–14)
RBC # BLD AUTO: 5.27 X10(6)/MCL (ref 4.7–6.1)
SODIUM SERPL-SCNC: 137 MMOL/L (ref 136–145)
WBC # SPEC AUTO: 2.91 X10(3)/MCL (ref 4.5–11.5)

## 2024-05-21 PROCEDURE — 99214 OFFICE O/P EST MOD 30 MIN: CPT | Mod: PBBFAC | Performed by: INTERNAL MEDICINE

## 2024-05-21 PROCEDURE — 80053 COMPREHEN METABOLIC PANEL: CPT | Performed by: INTERNAL MEDICINE

## 2024-05-21 PROCEDURE — 82105 ALPHA-FETOPROTEIN SERUM: CPT | Performed by: INTERNAL MEDICINE

## 2024-05-21 PROCEDURE — 36415 COLL VENOUS BLD VENIPUNCTURE: CPT | Performed by: INTERNAL MEDICINE

## 2024-05-21 PROCEDURE — 85025 COMPLETE CBC W/AUTO DIFF WBC: CPT | Performed by: INTERNAL MEDICINE

## 2024-05-21 PROCEDURE — 85610 PROTHROMBIN TIME: CPT | Performed by: INTERNAL MEDICINE

## 2024-05-21 NOTE — ASSESSMENT & PLAN NOTE
SCREENING:     Last EGD:   July 2023: three columns of grade II, small varices s/p banding x 3  Needs repeat EGD to assess for additional treatment    Last imaging study:    Dec 2023 CT a/p: no liver lesion, thrombosis as described              CIRRHOSIS COUNSELING:     - strict abstinence of alcohol use  - low sodium (salt) 2000mg per day  - Nutrition: 25-30kcal (calorie per body weight in kilogram) per day  - No need to restrict protein in diet  - High protein diet: 1.2-1.5 gram/kg (protein per body weight in kg) per day to prevent muscle mass loss  - Resistance exercises for muscle strength  - Avoid raw seafoods due to risk of fatal Vibrio vulnificus infection  - Avoid Non-steroidal anti-inflammatory drugs (NSAIDs) such as ibuprofen, Motrin, naproxen, Aleve due to risk of kidney damage  - Can take acetaminophen (tylenol), no more than 2000mg per day  - Compliance with all medications  - Ultrasound or MRI of the liver every 6 months for liver cancer screening  - Upper endoscopy every 1 year for varices

## 2024-05-21 NOTE — PROGRESS NOTES
Cirrhosis       Established Patient Note         TODAY'S VISIT DATE:  5/21/2024  The patient's last visit with me was on Visit date not found.     PCP: Navdeep Resendiz      Referring MD:   No ref. provider found    History of Present Illness:    Mr. Zimmerman is a 58 year old with HCV cirrhosis complicated by bleeding esophageal varices s/p banding and PVT here for follow-up.     Patient was admitted to the hospital in February 2022 for severe EDWARD.  Known to have HCV cirrhosis and EGD showed large esophageal varices with stigmata.  He reported a several week history of melena preceding.  He has undergone 4 EV bandings, last in July 2023.  His last EGD in July 2023 showed three columns of small, grade II varices s/p banding x 3, moderate PHG.  He was to [facility]-up for repeat banding but relocated for work.      Abd US in July 2023 showed cirrhosis with nonocclusive PVT.  He has a triple phase CT scan in September 2023 in [place] showed extensive portal vein, splenic vein and SMV thormbosis.  I started him on therapeutic lovenox and transitioned to Eliquis.  Repeat CT scan on 12/2023 showed improving thrombosis with some residual clot at the portal confluence.      Today...    Nausea usually in the morning imrpoved with eating.  He has occasional sharp abdominal pain but nothing consistent.  He is having regular stools daily, formed stools without any rectal bleeding or melena.  Weigth is stable.  He is following a low sodium diet. He denies any swelling, abdominal distention, itching, leg swelling, confusion. He remains on carvedilol 6.25 mg BID.  BP is good. Weight is stable.                Last colonoscopy: Last colonoscopy was several years ago in [place].  Does not remember MD or facility.  Was told was fine    He denies any tobacco use, alcohol use.  He denies any FH of liver disease, colon polyps, GI cancers.  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        Background:  Alcohol: none  IVDU: remote in the past  Cocaine: nasal in past  Tattoos: none    Tobacco: never smoker    Liver disease: HCV    MELD-Na: 10 (Dec 2023)   Child-Chaudhry Class:     Transplant: not under evaluation    Cirrhosis is decompensated with:    Ascites:     No   Spontaneous bacterial peritonitis:  No  Hepatic Encephalopathy:   No  Hepatocellular carcinoma:   No  Hepatorenal syndrome:   No  Hyponatremia:    No  Muscle wasting:    No  Portal vein thrombosis:   Yes        Medical/Surgical History:   Past Medical History:   Diagnosis Date    Acute pancreatitis 2018    [facility], [place]    Anemia 2012    Cirrhosis 2012    Diabetes mellitus     Portal vein thrombosis     Unspecified viral hepatitis C without hepatic coma      Past Surgical History:   Procedure Laterality Date    CHOLECYSTECTOMY      COLONOSCOPY  2018    EGD, WITH BANDING OF VARICES N/A 05/02/2022    Procedure: EGD, WITH BANDING OF VARICES;  Surgeon: Sujata Kong MD;  Location: [facility] ENDOSCOPY;  Service: Gastroenterology;  Laterality: N/A;    EGD, WITH BANDING OF VARICES N/A 07/05/2023    Procedure: EGD, WITH BANDING OF VARICES;  Surgeon: Sujata Kong MD;  Location: [facility] ENDOSCOPY;  Service: Gastroenterology;  Laterality: N/A;    ESOPHAGOGASTRODUODENOSCOPY  02/22/2022    with biopsy and bleeder control    ESOPHAGOGASTRODUODENOSCOPY  03/28/2022    wtih banding    ESOPHAGOGASTRODUODENOSCOPY N/A 06/06/2022    Procedure: EGD (ESOPHAGOGASTRODUODENOSCOPY);  Surgeon: Sujata Kong MD;  Location: [facility] ENDOSCOPY;  Service: Gastroenterology;  Laterality: N/A;    ESOPHAGUS SURGERY  FEBRUARY 2022    [facility]    LIVER BIOPSY  2012    Stage IV cirrhosis    LIVER SURGERY  2012    Biopsy    UPPER GASTROINTESTINAL ENDOSCOPY  Feb 2022    [facility]         Family History:   Family History   Problem Relation Name Age of Onset    Cancer Mother      Cancer Brother          Social History:   Social History  "    Socioeconomic History    Marital status:    Tobacco Use    Smoking status: Never    Smokeless tobacco: Never    Tobacco comments:     Never smoked   Substance and Sexual Activity    Alcohol use: Never     Comment: Not since 1999    Drug use: Yes     Types: Marijuana     Comment: Prescription cannabis    Sexual activity: Yes     Partners: Female        Review of patient's allergies indicates:  No Known Allergies    Current Medications:   Outpatient Medications Marked as Taking for the 5/21/24 encounter (Office Visit) with Sujata Kong MD   Medication Sig Dispense Refill    ALPRAZolam (XANAX) 0.5 MG tablet Take 0.5 mg by mouth 2 (two) times daily as needed.      apixaban (ELIQUIS) 5 mg Tab Take 1 tablet (5 mg total) by mouth 2 (two) times daily. 60 tablet 5    carvediloL (COREG) 6.25 MG tablet Take 1 tablet (6.25 mg total) by mouth 2 (two) times daily. 60 tablet 11    diphenhydrAMINE (BENADRYL) 25 mg capsule Take 25 mg by mouth nightly as needed for Insomnia.      metFORMIN (GLUCOPHAGE) 1000 MG tablet Take 1,000 mg by mouth 2 (two) times daily.          Review of Systems    Vital Signs:  /70 (BP Location: Left arm, Patient Position: Sitting, BP Method: Large (Automatic))   Pulse 77   Temp 98 °F (36.7 °C) (Oral)   Resp 16   Ht 5' 9" (1.753 m)   Wt 80.5 kg (177 lb 7.5 oz)   SpO2 98%   BMI 26.21 kg/m²      Physical Exam    Labs: Reviewed      WBC   Date Value Ref Range Status   12/19/2023 2.84 (L) 4.50 - 11.50 x10(3)/mcL Final     Hgb   Date Value Ref Range Status   12/19/2023 14.7 14.0 - 18.0 g/dL Final     Hct   Date Value Ref Range Status   12/19/2023 43.8 42.0 - 52.0 % Final     Platelet   Date Value Ref Range Status   12/19/2023 90 (L) 130 - 400 x10(3)/mcL Final     MCV   Date Value Ref Range Status   12/19/2023 80.4 80.0 - 94.0 fL Final       Sodium   Date Value Ref Range Status   12/19/2023 138 136 - 145 mmol/L Final     Potassium   Date Value Ref Range Status   12/19/2023 4.0 3.5 " - 5.1 mmol/L Final     CO2   Date Value Ref Range Status   12/19/2023 22 22 - 29 mmol/L Final     Blood Urea Nitrogen   Date Value Ref Range Status   12/19/2023 11.4 8.4 - 25.7 mg/dL Final     Creatinine   Date Value Ref Range Status   05/01/2024 0.60 (L) 0.73 - 1.18 mg/dL Final     Bilirubin Total   Date Value Ref Range Status   12/19/2023 0.9 <=1.5 mg/dL Final     Bilirubin Direct   Date Value Ref Range Status   02/23/2022 0.5 0.0 - 0.5      Bilirubin Indirect   Date Value Ref Range Status   02/23/2022 0.70 0.00 - 0.80      ALP   Date Value Ref Range Status   12/19/2023 78 40 - 150 unit/L Final     AST   Date Value Ref Range Status   12/19/2023 28 5 - 34 unit/L Final     ALT   Date Value Ref Range Status   12/19/2023 29 0 - 55 unit/L Final     Protein Total   Date Value Ref Range Status   12/19/2023 7.9 6.4 - 8.3 gm/dL Final     Albumin   Date Value Ref Range Status   12/19/2023 4.1 3.5 - 5.0 g/dL Final       PT   Date Value Ref Range Status   12/19/2023 18.4 (H) 11.4 - 14.0 seconds Final     INR   Date Value Ref Range Status   12/19/2023 1.6 (H) <=1.3 Final       Alpha Fetoprotein Level   Date Value Ref Range Status   12/19/2023 <2.00 <=8.90 ng/mL Final            Assessment/Plan:    Problem List Items Addressed This Visit    None                HEALTH MAINTENANCE:     Vaccinations:    Influenza (inactive):  recommended annually   PCV 20 (Prevnar): recommended  Tetanus (TdaP): recommended every 10 years  HPV (males and females ages 11-46 yo): N/A  Meningococcal:  No risk factors   Hepatitis B: not immune     Hepatitis A:  immune     MMR (live vaccine): UTD          Chickenpox status/Varicella (live vaccine):  + Ab    Shingrix: recommended   COVID-19: recommend      Colon cancer risk:    Colonoscopy:      DEXA scan:       Follow up:                Final     AST   Date Value Ref Range Status   12/19/2023 28 5 - 34 unit/L Final     ALT   Date Value Ref Range Status   12/19/2023 29 0 - 55 unit/L Final     Protein Total   Date Value Ref Range Status   12/19/2023 7.9 6.4 - 8.3 gm/dL Final     Albumin   Date Value Ref Range Status   12/19/2023 4.1 3.5 - 5.0 g/dL Final       PT   Date Value Ref Range Status   12/19/2023 18.4 (H) 11.4 - 14.0 seconds Final     INR   Date Value Ref Range Status   12/19/2023 1.6 (H) <=1.3 Final       Alpha Fetoprotein Level   Date Value Ref Range Status   12/19/2023 <2.00 <=8.90 ng/mL Final            Assessment/Plan:    Problem List Items Addressed This Visit          GI    Esophageal varices without bleeding     EV bleeding in Feb 2022  S/p multiple bandings  Last EGD July 2023: three columns of grade II, small varices s/p banding x 3  Needs repeat EGD to assess for additional treatment  Continue carvedilol 6.25mg bid         Relevant Orders    Ambulatory referral/consult to Hepatology    Case Request Endoscopy: EGD (ESOPHAGOGASTRODUODENOSCOPY) (Completed)    Compensated HCV cirrhosis - Primary     Ascites: none.  Low sodium diet  HE: none  EV: Bleeding in 2022.  S/p several bandings, last July 2023.  Continue carvedilol 6.25 mg BID.  Schedule EGD.     HCC: AFP and q6 imaging      SCREENING:     Last EGD:   July 2023: three columns of grade II, small varices s/p banding x 3  Needs repeat EGD to assess for additional treatment    Last imaging study:    CT a/p May 2024 - mixing artifact in portal vein, unchanged nonocclusive thrombus in PV.  No liver lesion              CIRRHOSIS COUNSELING:     - strict abstinence of alcohol use  - low sodium (salt) 2000mg per day  - Nutrition: 25-30kcal (calorie per body weight in kilogram) per day  - No need to restrict protein in diet  - High protein diet: 1.2-1.5 gram/kg (protein per body weight in kg) per day to prevent muscle mass loss  - Resistance exercises for muscle strength  - Avoid raw  seafoods due to risk of fatal Vibrio vulnificus infection  - Avoid Non-steroidal anti-inflammatory drugs (NSAIDs) such as ibuprofen, Motrin, naproxen, Aleve due to risk of kidney damage  - Can take acetaminophen (tylenol), no more than 2000mg per day  - Compliance with all medications  - Ultrasound or MRI of the liver every 6 months for liver cancer screening  - Upper endoscopy every 1 year for varices         Relevant Orders    AFP Tumor Marker (Completed)    Protime-INR (Completed)    Comprehensive Metabolic Panel (Completed)    CBC Auto Differential (Completed)    Ambulatory referral/consult to Hepatology    Case Request Endoscopy: EGD (ESOPHAGOGASTRODUODENOSCOPY) (Completed)    Portal vein thrombosis     Diagnosed in July 2023.   Extensive thrombosis involving the portal vein, splenic vein, and SMV  No HCC on triple phase  Started on therapeutic lovenox with transition to Eliquis  Dec 2023 with improving thrombosis with residual clot at the portal confluence still involving the PV, SV, and SMV  CT a/p May 2024 - mixing artifact in portal vein, unchanged nonocclusive thrombus in PV  Continue Eliquis 5 mg BID  Repeat CT with contrast in 3 months         Relevant Orders    Ambulatory referral/consult to Hepatology         HEALTH MAINTENANCE:     Vaccinations:    Influenza (inactive):  recommended annually   PCV 20 (Prevnar): recommended  Tetanus (TdaP): recommended every 10 years  HPV (males and females ages 11-46 yo): N/A  Meningococcal:  No risk factors   Hepatitis B: not immune     Hepatitis A:  immune     MMR (live vaccine): UTD          Chickenpox status/Varicella (live vaccine):  + Ab    Shingrix: recommended   COVID-19: recommend      Colon cancer risk:    Colonoscopy:  Last colonoscopy was several years ago in [place].  Does not remember MD or facility.  Was told was fine       DEXA scan: recommend      Follow up: 3 months

## 2024-05-21 NOTE — ASSESSMENT & PLAN NOTE
EV bleeding in Feb 2022  S/p multiple bandings  Last EGD July 2023: three columns of grade II, small varices s/p banding x 3  Needs repeat EGD to assess for additional treatment

## 2024-05-24 ENCOUNTER — TELEPHONE (OUTPATIENT)
Dept: HEPATOLOGY | Facility: CLINIC | Age: 59
End: 2024-05-24
Payer: COMMERCIAL

## 2024-05-24 NOTE — TELEPHONE ENCOUNTER
Dr. Sujata Kong ordered that patient be scheduled for a hepatology consult visit for cirrhosis.  I spoke with patient.  He states that he was treated with Epclusa and hep c cured so no visit with PA Scheuermann is needed.  Patient wants consult visit with Dr. Reed scheduled in [place] at the [facility] site.  He has BC[employer] Ins.  Request sent to  to update his coverage.

## 2024-06-05 ENCOUNTER — TELEPHONE (OUTPATIENT)
Dept: HEPATOLOGY | Facility: CLINIC | Age: 59
End: 2024-06-05
Payer: COMMERCIAL

## 2024-06-05 NOTE — TELEPHONE ENCOUNTER
Reached out to pt in regards to scheduling. Pt did not answer, left vm for pt to give the office a call back.  Scheduled appt and sending [facility] [employer] message to pt

## 2024-06-07 DIAGNOSIS — I81 PORTAL VEIN THROMBOSIS: ICD-10-CM

## 2024-06-18 ENCOUNTER — ANESTHESIA EVENT (OUTPATIENT)
Dept: ENDOSCOPY | Facility: HOSPITAL | Age: 59
End: 2024-06-18
Payer: COMMERCIAL

## 2024-06-18 RX ORDER — LIDOCAINE HYDROCHLORIDE 10 MG/ML
1 INJECTION, SOLUTION EPIDURAL; INFILTRATION; INTRACAUDAL; PERINEURAL ONCE
OUTPATIENT
Start: 2024-06-18 | End: 2024-06-18

## 2024-06-18 NOTE — ANESTHESIA PREPROCEDURE EVALUATION
"                                                                                                             06/18/2024  Ugo Zimmerman is a 58 y.o., male with PMHx of Hep C/cirrhosis, DM presents for screening EGD.    Carvedilol--last dose 1250  Eliquis--last dose 3 days ago    Vitals:    06/05/24 1425   Weight: 79.8 kg (176 lb)   Height: 5' 9" (1.753 m)         Active Ambulatory Problems     Diagnosis Date Noted    Esophageal varices without bleeding 05/02/2022    Compensated HCV cirrhosis 09/21/2023    Portal vein thrombosis 12/19/2023     Resolved Ambulatory Problems     Diagnosis Date Noted    No Resolved Ambulatory Problems     Past Medical History:   Diagnosis Date    Acute pancreatitis 2018    Anemia 2012    Cirrhosis 2012    Diabetes mellitus     Unspecified viral hepatitis C without hepatic coma        Pre-op Assessment    I have reviewed the Patient Summary Reports.     I have reviewed the Nursing Notes. I have reviewed the NPO Status.   I have reviewed the Medications.     Review of Systems  Anesthesia Hx:  No problems with previous Anesthesia   History of prior surgery of interest to airway management or planning:          Denies Family Hx of Anesthesia complications.    Denies Personal Hx of Anesthesia complications.                    Hematology/Oncology:  Hematology Normal   Oncology Normal                                   EENT/Dental:  EENT/Dental Normal           Cardiovascular:  Cardiovascular Normal                                            Pulmonary:  Pulmonary Normal                       Renal/:  Renal/ Normal                 Hepatic/GI:  Hepatic/GI Normal                 Musculoskeletal:  Musculoskeletal Normal                Neurological:  Neurology Normal                                      Endocrine:  Endocrine Normal            Dermatological:  Skin Normal    Psych:  Psychiatric Normal                    Physical Exam  General: Alert    Airway:  Mallampati: I / I  Mouth Opening: " Normal  TM Distance: Normal  Tongue: Normal  Neck ROM: Normal ROM    Dental:  Intact      Lab Results   Component Value Date    WBC 2.91 (L) 05/21/2024    HGB 14.6 05/21/2024    HCT 42.3 05/21/2024    MCV 80.3 05/21/2024    PLT 91 (L) 05/21/2024       CMP  Sodium   Date Value Ref Range Status   05/21/2024 137 136 - 145 mmol/L Final     Potassium   Date Value Ref Range Status   05/21/2024 3.8 3.5 - 5.1 mmol/L Final     Chloride   Date Value Ref Range Status   05/21/2024 106 98 - 107 mmol/L Final     CO2   Date Value Ref Range Status   05/21/2024 22 22 - 29 mmol/L Final     Blood Urea Nitrogen   Date Value Ref Range Status   05/21/2024 12.4 8.4 - 25.7 mg/dL Final     Creatinine   Date Value Ref Range Status   05/21/2024 0.83 0.73 - 1.18 mg/dL Final     Calcium   Date Value Ref Range Status   05/21/2024 9.9 8.4 - 10.2 mg/dL Final     Albumin   Date Value Ref Range Status   05/21/2024 4.2 3.5 - 5.0 g/dL Final     Bilirubin Total   Date Value Ref Range Status   05/21/2024 1.2 <=1.5 mg/dL Final     ALP   Date Value Ref Range Status   05/21/2024 68 40 - 150 unit/L Final     AST   Date Value Ref Range Status   05/21/2024 27 5 - 34 unit/L Final     ALT   Date Value Ref Range Status   05/21/2024 28 0 - 55 unit/L Final     eGFR   Date Value Ref Range Status   05/21/2024 >60 mL/min/1.73/m2 Final     Lab Results   Component Value Date    HGBA1C 6.8 02/20/2022     Lab Results   Component Value Date    INR 1.4 (H) 05/21/2024    INR 1.6 (H) 12/19/2023    INR 1.29 02/20/2022           Anesthesia Plan  Type of Anesthesia, risks & benefits discussed:    Anesthesia Type: Gen Natural Airway  Intra-op Monitoring Plan: Standard ASA Monitors  Post Op Pain Control Plan: IV/PO Opioids PRN  (medical reason for not using multimodal pain management)  Induction:  IV  Informed Consent: Informed consent signed with the Patient and all parties understand the risks and agree with anesthesia plan.  All questions answered. Patient consented to blood  products? No  ASA Score: 3  Day of Surgery Review of History & Physical: H&P Update referred to the surgeon/provider.    Ready For Surgery From Anesthesia Perspective.     .

## 2024-06-19 ENCOUNTER — HOSPITAL ENCOUNTER (OUTPATIENT)
Facility: HOSPITAL | Age: 59
Discharge: HOME | End: 2024-06-19
Attending: INTERNAL MEDICINE | Admitting: INTERNAL MEDICINE
Payer: COMMERCIAL

## 2024-06-19 ENCOUNTER — ANESTHESIA (OUTPATIENT)
Dept: ENDOSCOPY | Facility: HOSPITAL | Age: 59
End: 2024-06-19
Payer: COMMERCIAL

## 2024-06-19 DIAGNOSIS — K74.69 COMPENSATED HCV CIRRHOSIS: ICD-10-CM

## 2024-06-19 DIAGNOSIS — B19.20 COMPENSATED HCV CIRRHOSIS: ICD-10-CM

## 2024-06-19 DIAGNOSIS — I85.10 SECONDARY ESOPHAGEAL VARICES WITHOUT BLEEDING: ICD-10-CM

## 2024-06-19 LAB — POCT GLUCOSE: 109 MG/DL (ref 70–110)

## 2024-06-19 PROCEDURE — D9220A PRA ANESTHESIA: Mod: ,,, | Performed by: NURSE ANESTHETIST, CERTIFIED REGISTERED

## 2024-06-19 PROCEDURE — 88305 TISSUE EXAM BY PATHOLOGIST: CPT | Mod: TC | Performed by: INTERNAL MEDICINE

## 2024-06-19 PROCEDURE — 27201423 OPTIME MED/SURG SUP & DEVICES STERILE SUPPLY: Performed by: INTERNAL MEDICINE

## 2024-06-19 PROCEDURE — 82962 GLUCOSE BLOOD TEST: CPT | Performed by: INTERNAL MEDICINE

## 2024-06-19 PROCEDURE — 63600175 PHARM REV CODE 636 W HCPCS: Performed by: ANESTHESIOLOGY

## 2024-06-19 PROCEDURE — 25000003 PHARM REV CODE 250: Performed by: SPECIALIST

## 2024-06-19 PROCEDURE — 37000008 HC ANESTHESIA 1ST 15 MINUTES: Performed by: INTERNAL MEDICINE

## 2024-06-19 PROCEDURE — 43239 EGD BIOPSY SINGLE/MULTIPLE: CPT | Performed by: INTERNAL MEDICINE

## 2024-06-19 PROCEDURE — 25000003 PHARM REV CODE 250: Performed by: NURSE ANESTHETIST, CERTIFIED REGISTERED

## 2024-06-19 RX ORDER — CARVEDILOL 6.25 MG/1
6.25 TABLET ORAL ONCE
Status: COMPLETED | OUTPATIENT
Start: 2024-06-19 | End: 2024-06-19

## 2024-06-19 RX ORDER — PROPOFOL 10 MG/ML
VIAL (ML) INTRAVENOUS
Status: DISCONTINUED | OUTPATIENT
Start: 2024-06-19 | End: 2024-06-19

## 2024-06-19 RX ORDER — SODIUM CHLORIDE, SODIUM LACTATE, POTASSIUM CHLORIDE, CALCIUM CHLORIDE 600; 310; 30; 20 MG/100ML; MG/100ML; MG/100ML; MG/100ML
INJECTION, SOLUTION INTRAVENOUS CONTINUOUS
Status: DISCONTINUED | OUTPATIENT
Start: 2024-06-19 | End: 2024-06-19 | Stop reason: HOSPADM

## 2024-06-19 RX ORDER — LIDOCAINE HYDROCHLORIDE 20 MG/ML
INJECTION INTRAVENOUS
Status: DISCONTINUED | OUTPATIENT
Start: 2024-06-19 | End: 2024-06-19

## 2024-06-19 RX ADMIN — Medication 40 MG: at 02:06

## 2024-06-19 RX ADMIN — CARVEDILOL 6.25 MG: 6.25 TABLET, FILM COATED ORAL at 01:06

## 2024-06-19 RX ADMIN — LIDOCAINE HYDROCHLORIDE 50 MG: 20 INJECTION INTRAVENOUS at 02:06

## 2024-06-19 RX ADMIN — Medication 20 MG: at 02:06

## 2024-06-19 RX ADMIN — Medication 100 MG: at 02:06

## 2024-06-19 RX ADMIN — SODIUM CHLORIDE, POTASSIUM CHLORIDE, SODIUM LACTATE AND CALCIUM CHLORIDE: 600; 310; 30; 20 INJECTION, SOLUTION INTRAVENOUS at 12:06

## 2024-06-19 NOTE — ANESTHESIA POSTPROCEDURE EVALUATION
Anesthesia Post Evaluation    Patient: Ugo Zimmerman    Procedure(s) Performed: Procedure(s) (LRB):  EGD, WITH CLOSED BIOPSY (N/A)    Final Anesthesia Type: general      Patient location during evaluation: GI PACU  Patient participation: Yes- Able to Participate  Level of consciousness: awake and alert  Post-procedure vital signs: reviewed and stable  Pain management: adequate  Airway patency: patent    PONV status at discharge: No PONV  Anesthetic complications: no      Cardiovascular status: stable  Respiratory status: spontaneous ventilation and unassisted  Hydration status: euvolemic  Follow-up not needed.              Vitals Value Taken Time   /77 06/19/24 1311   Temp 36.6 °C (97.9 °F) 06/19/24 1259   Pulse 69 06/19/24 1259   Resp 20 06/19/24 1259   SpO2 96 % 06/19/24 1259         No case tracking events are documented in the log.      Pain/Alexander Score: No data recorded         Tele monitor applied to pt prior to going to floor

## 2024-06-19 NOTE — H&P
EGD History and Physical    Patient Name: Ugo Zimmerman  MRN: [id number]  : 1965  Date of Procedure:  2024  Referring Physician: Sujata Kong MD  Primary Physician: Navdeep Resendiz MD  Procedure Physician: Sujata Kong MD, MPH     Procedure - EGD  ASA - per anesthesia  Mallampati - per anesthesia  History of Anesthesia problems - no  Family history Anesthesia problems -  no   Plan of anesthesia - General    Diagnosis:  follow-up for esophageal varices  Chief Complaint: Same as above    HPI: Patient is an 58 y.o. male is here for the above.     Mr. Zimmerman is a 58 year old with HCV cirrhosis complicated by bleeding esophageal varices s/p banding and PVT here for an EGD.     Patient was admitted to the hospital in 2022 for severe EDWARD.  Known to have HCV cirrhosis and EGD showed large esophageal varices with stigmata.  He reported a several week history of melena preceding.  He has undergone 4 EV bandings, last in 2023.  His last EGD in 2023 showed three columns of small, grade II varices s/p banding x 3, moderate PHG.  He was to [facility] for repeat banding but relocated for work.      Abd US in 2023 showed cirrhosis with nonocclusive PVT.  He has a triple phase CT scan in 2023 in [place] showed extensive portal vein, splenic vein and SMV thormbosis.  I started him on therapeutic lovenox and transitioned to Eliquis.  Repeat CT scan on 2023 showed improving thrombosis with some residual clot at the portal confluence.       Today he reports nausea usually in the morning imrpoved with eating.  He has occasional sharp abdominal pain but nothing consistent.  He is having regular stools daily, formed stools without any rectal bleeding or melena.  Weigth is stable.  He is following a low sodium diet. He denies any swelling, abdominal distention, itching, leg swelling, confusion. He remains on carvedilol 6.25 mg BID.  BP is good. Weight is stable.           Last colonoscopy: years ago in [place]  Family history: denies  Anticoagulation: Eliquis - not on hold for 48 hours    ROS:  Constitutional: No fevers, chills, No weight loss  CV: No chest pain  Pulm: No cough, No shortness of breath  GI: see HPI    Medical History:   Past Medical History:   Diagnosis Date    Acute pancreatitis 2018    [facility] [place]    Anemia 2012    Cirrhosis 2012    Diabetes mellitus     Portal vein thrombosis     Unspecified viral hepatitis C without hepatic coma          Surgical History:   Past Surgical History:   Procedure Laterality Date    CHOLECYSTECTOMY      COLONOSCOPY  2018    EGD, WITH BANDING OF VARICES N/A 05/02/2022    Procedure: EGD, WITH BANDING OF VARICES;  Surgeon: Sujata Kong MD;  Location: [facility] ENDOSCOPY;  Service: Gastroenterology;  Laterality: N/A;    EGD, WITH BANDING OF VARICES N/A 07/05/2023    Procedure: EGD, WITH BANDING OF VARICES;  Surgeon: Sujata Kong MD;  Location: [facility] ENDOSCOPY;  Service: Gastroenterology;  Laterality: N/A;    ESOPHAGOGASTRODUODENOSCOPY  02/22/2022    with biopsy and bleeder control    ESOPHAGOGASTRODUODENOSCOPY  03/28/2022    wtih banding    ESOPHAGOGASTRODUODENOSCOPY N/A 06/06/2022    Procedure: EGD (ESOPHAGOGASTRODUODENOSCOPY);  Surgeon: Sujata Kong MD;  Location: [facility] ENDOSCOPY;  Service: Gastroenterology;  Laterality: N/A;    ESOPHAGUS SURGERY  FEBRUARY 2022    [facility]    LIVER BIOPSY  2012    Stage IV cirrhosis    LIVER SURGERY  2012    Biopsy    UPPER GASTROINTESTINAL ENDOSCOPY  Feb 2022    [place]       Family History:   Family History   Problem Relation Name Age of Onset    Cancer Mother      Cancer Brother     .    Social History:   Social History     Socioeconomic History    Marital status:    Tobacco Use    Smoking status: Never    Smokeless tobacco: Never    Tobacco comments:     Never smoked   Substance and Sexual Activity    Alcohol use: Never     Comment: Not since  "1999    Drug use: Yes     Types: Marijuana     Comment: Prescription cannabis    Sexual activity: Yes     Partners: Female       Review of patient's allergies indicates:  No Known Allergies    Medications:   Medications Prior to Admission   Medication Sig Dispense Refill Last Dose    ALPRAZolam (XANAX) 0.5 MG tablet Take 0.5 mg by mouth 2 (two) times daily as needed.   6/19/2024 at 0900    apixaban (ELIQUIS) 5 mg Tab Take 1 tablet (5 mg total) by mouth 2 (two) times daily. 60 tablet 5 6/18/2024 at 0530    carvediloL (COREG) 6.25 MG tablet Take 1 tablet (6.25 mg total) by mouth 2 (two) times daily. 60 tablet 11 6/19/2024    diphenhydrAMINE (BENADRYL) 25 mg capsule Take 25 mg by mouth nightly as needed for Insomnia.   Taking    metFORMIN (GLUCOPHAGE) 1000 MG tablet Take 1,000 mg by mouth 2 (two) times daily.   6/18/2024         Physical Exam:    Vital Signs:   Vitals:    06/19/24 1311   BP: 126/77   Pulse:    Resp:    Temp:      /77   Pulse 69   Temp 97.9 °F (36.6 °C) (Oral)   Resp 20   Ht 5' 9" (1.753 m)   Wt 80.5 kg (177 lb 6.4 oz)   SpO2 96%   BMI 26.20 kg/m²     General:          Well appearing in no acute distress  Lungs: Clear to auscultation bilaterally, respirations unlabored  Heart: Regular rate and rhythm, S1 and S2 normal, no obvious murmurs  Abdomen:         Soft, non-tender, bowel sounds normal, no masses, no organomegaly        Labs:  Lab Results   Component Value Date    WBC 2.91 (L) 05/21/2024    HGB 14.6 05/21/2024    HCT 42.3 05/21/2024    MCV 80.3 05/21/2024    PLT 91 (L) 05/21/2024     Lab Results   Component Value Date    INR 1.4 (H) 05/21/2024     Lab Results   Component Value Date     05/21/2024    K 3.8 05/21/2024    CO2 22 05/21/2024    BUN 12.4 05/21/2024    CREATININE 0.83 05/21/2024    LABPROT 17.7 (H) 05/21/2024    LABPROT 7.9 05/21/2024    ALBUMIN 4.2 05/21/2024    BILITOT 1.2 05/21/2024    ALKPHOS 68 05/21/2024    ALT 28 05/21/2024    AST 27 05/21/2024       Assessment " and Plan:     History reviewed, vital signs satisfactory, cardiopulmonary status satisfactory.  I have explained the sedation options, risks, benefits, and alternatives of this endoscopic procedure to the patient including but not limited to bleeding, inflammation, infection, perforation, and death.  All questions were answered and the patient consented to proceed with procedure as planned.   The patient is deemed an appropriate candidate for the sedation as planned.      Sujata Kong MD, MPH   of Clinical Medicine  Gastroenterology and Hepatology  [facility]    6/19/2024  1:57 PM

## 2024-06-19 NOTE — PLAN OF CARE
Patient resting in bed drowsy. Wife at bedside with coffee fixed for patient. Instructed patient needs to be more awake before serving and wife verbalized understanding. Call bell in reach.

## 2024-06-19 NOTE — PROVATION PATIENT INSTRUCTIONS
Discharge Summary/Instructions after an Endoscopic Procedure  Patient Name: Ugo Weiner MRN: [id number]  Patient YOB: 1965  Wednesday, June 19, 2024  Sujata Kong MD  Dear patient,  As a result of recent federal legislation (The Federal Cures Act), you may   receive lab or pathology results from your procedure in your [facility]   account before your physician is able to contact you. Your physician or   their representative will relay the results to you with their   recommendations at their soonest availability.  Thank you,  RESTRICTIONS:  During your procedure today, you received medications for sedation.  These   medications may affect your judgment, balance and coordination.  Therefore,   for 24 hours, you have the following restrictions:   - DO NOT drive a car, operate machinery, make legal/financial decisions,   sign important papers or drink alcohol.    ACTIVITY:  Today: no heavy lifting, straining or running due to procedural   sedation/anesthesia.  The following day: return to full activity including work.  DIET:  Eat and drink normally unless instructed otherwise.     TREATMENT FOR COMMON SIDE EFFECTS:  - Mild abdominal pain, nausea, belching, bloating or excessive gas:  rest,   eat lightly and use a heating pad.  - Sore Throat: treat with throat lozenges and/or gargle with warm salt   water.  - Because air was used during the procedure, expelling large amounts of air   from your rectum or belching is normal.  - If a bowel prep was taken, you may not have a bowel movement for 1-3 days.    This is normal.  SYMPTOMS TO WATCH FOR AND REPORT TO YOUR PHYSICIAN:  1. Abdominal pain or bloating, other than gas cramps.  2. Chest pain.  3. Back pain.  4. Signs of infection such as: chills or fever occurring within 24 hours   after the procedure.  5. Rectal bleeding, which would show as bright red, maroon, or black stools.   (A tablespoon of blood from the rectum is not serious,  especially if   hemorrhoids are present.)  6. Vomiting.  7. Weakness or dizziness.  GO DIRECTLY TO THE NEAREST EMERGENCY ROOM IF YOU HAVE ANY OF THE FOLLOWING:      Difficulty breathing              Chills and/or fever over 101 F   Persistent vomiting and/or vomiting blood   Severe abdominal pain   Severe chest pain   Black, tarry stools   Bleeding- more than one tablespoon   Any other symptom or condition that you feel may need urgent attention  Your doctor recommends these additional instructions:  If any biopsies were taken, your doctors clinic will contact you in 1 to 2   weeks with any results.  - Patient has a contact number available for emergencies.  The signs and   symptoms of potential delayed complications were discussed with the   patient.  Return to normal activities tomorrow.  Written discharge   instructions were provided to the patient.   - Discharge patient to home.   - Low sodium diet.   - Continue present medications.   - Await pathology results.   - Repeat upper endoscopy in 1 year for surveillance.  For questions, problems or results please call your physician - Sujata Kong MD at Work:  [phone], Work:  [phone].  [facility] , EMERGENCY ROOM PHONE NUMBER: [phone]  IF A COMPLICATION OR EMERGENCY SITUATION ARISES AND YOU ARE UNABLE TO REACH   YOUR PHYSICIAN - GO DIRECTLY TO THE EMERGENCY ROOM.  Sujata Kong MD  6/19/2024 2:23:41 PM  This report has been verified and signed electronically.  Dear patient,  As a result of recent federal legislation (The Federal Cures Act), you may   receive lab or pathology results from your procedure in your [facility]   account before your physician is able to contact you. Your physician or   their representative will relay the results to you with their   recommendations at their soonest availability.  Thank you,  PR[employer]ATION

## 2024-06-19 NOTE — TRANSFER OF CARE
Anesthesia Transfer of Care Note    Patient: Ugo Zimmerman    Procedure(s) Performed: Procedure(s) (LRB):  EGD, WITH CLOSED BIOPSY (N/A)    Patient location: GI    Anesthesia Type: general    Transport from OR: Transported from OR on room air with adequate spontaneous ventilation    Post pain: adequate analgesia    Post assessment: no apparent anesthetic complications and tolerated procedure well    Post vital signs: stable    Level of consciousness: responds to stimulation    Nausea/Vomiting: no nausea/vomiting    Complications: none    Transfer of care protocol was followed

## 2024-06-21 VITALS
SYSTOLIC BLOOD PRESSURE: 109 MMHG | HEART RATE: 78 BPM | DIASTOLIC BLOOD PRESSURE: 71 MMHG | WEIGHT: 177.38 LBS | TEMPERATURE: 98 F | BODY MASS INDEX: 26.27 KG/M2 | RESPIRATION RATE: 20 BRPM | OXYGEN SATURATION: 96 % | HEIGHT: 69 IN

## 2024-06-21 LAB
DHEA SERPL-MCNC: NORMAL
ESTROGEN SERPL-MCNC: NORMAL PG/ML
INSULIN SERPL-ACNC: NORMAL U[IU]/ML
LAB AP CLINICAL INFORMATION: NORMAL
LAB AP GROSS DESCRIPTION: NORMAL
LAB AP REPORT FOOTNOTES: NORMAL
T3RU NFR SERPL: NORMAL %

## 2024-06-24 ENCOUNTER — TELEPHONE (OUTPATIENT)
Dept: ENDOSCOPY | Facility: HOSPITAL | Age: 59
End: 2024-06-24
Payer: COMMERCIAL

## 2024-06-24 NOTE — TELEPHONE ENCOUNTER
----- Message from Sujata Kong MD sent at 6/21/2024  4:57 PM CDT -----  [employer] message sent to the patient about results.  Please notify patient of results if not viewed.    Thanks,  MMB

## 2024-06-25 ENCOUNTER — TELEPHONE (OUTPATIENT)
Dept: ENDOSCOPY | Facility: HOSPITAL | Age: 59
End: 2024-06-25
Payer: COMMERCIAL

## 2024-06-25 NOTE — TELEPHONE ENCOUNTER
----- Message from Sujata Kong MD sent at 6/21/2024  4:57 PM CDT -----  [facility] [employer] [facility] message sent to the patient about results.  Please notify patient of results if not viewed.    Thanks,  MMB

## 2024-07-16 DIAGNOSIS — I81 PORTAL VEIN THROMBOSIS: ICD-10-CM

## 2024-09-09 ENCOUNTER — OFFICE VISIT (OUTPATIENT)
Dept: HEPATOLOGY | Facility: CLINIC | Age: 59
End: 2024-09-09

## 2024-09-09 VITALS
DIASTOLIC BLOOD PRESSURE: 81 MMHG | SYSTOLIC BLOOD PRESSURE: 139 MMHG | OXYGEN SATURATION: 99 % | HEART RATE: 62 BPM | WEIGHT: 177.5 LBS | BODY MASS INDEX: 26.29 KG/M2 | RESPIRATION RATE: 20 BRPM | HEIGHT: 69 IN

## 2024-09-09 DIAGNOSIS — I81 PORTAL VEIN THROMBOSIS: ICD-10-CM

## 2024-09-09 DIAGNOSIS — B19.20 COMPENSATED HCV CIRRHOSIS: Primary | ICD-10-CM

## 2024-09-09 DIAGNOSIS — K74.69 COMPENSATED HCV CIRRHOSIS: Primary | ICD-10-CM

## 2024-09-09 DIAGNOSIS — Z87.19 HISTORY OF ESOPHAGEAL VARICES WITH BLEEDING: ICD-10-CM

## 2024-09-09 PROCEDURE — 99205 OFFICE O/P NEW HI 60 MIN: CPT | Mod: S$GLB,,, | Performed by: STUDENT IN AN ORGANIZED HEALTH CARE EDUCATION/TRAINING PROGRAM

## 2024-09-09 NOTE — PROGRESS NOTES
Hepatology Consult Note    Referring provider: Dr. Sujata Kong  PCP: Navdeep Resendiz MD    Chief complaint: HCV cirrhosis    HPI:  Ugo Zimmerman is a 58 y.o. male who was referred to Hepatology Clinic for hepatitis C related cirrhosis.    He reports being diagnosed with hepatitis C as well as cirrhosis approximately 10 years ago.  He was treated with interferon and ribavirin without SVR.  He was then treated with Epclusa approximately 5 years ago with SVR.  His cirrhosis has been complicated by variceal bleeding in 2020.  He denies any recent GI bleeding as well as other signs of decompensated cirrhosis including no recent abdominal distention, encephalopathy, jaundice.     Of note he has history of portal vein thrombosis extending into the splenic vein and SMV for which he was started on anticoagulation.  Suspect this contributed to his variceal bleeding.  He does not drink alcohol and has never been a heavy drinker. His sister had alcohol-related cirrhosis.  No other known family history of liver disease.    Past Medical History:   Diagnosis Date    Acute pancreatitis 2018    [facility], [place]    Anemia 2012    Cirrhosis 2012    Diabetes mellitus     Portal vein thrombosis     Unspecified viral hepatitis C without hepatic coma        Past Surgical History:   Procedure Laterality Date    CHOLECYSTECTOMY      COLONOSCOPY  2018    EGD, WITH BANDING OF VARICES N/A 05/02/2022    Procedure: EGD, WITH BANDING OF VARICES;  Surgeon: Sujata Kong MD;  Location: [facility] ENDOSCOPY;  Service: Gastroenterology;  Laterality: N/A;    EGD, WITH BANDING OF VARICES N/A 07/05/2023    Procedure: EGD, WITH BANDING OF VARICES;  Surgeon: Sujata Kong MD;  Location: [facility] ENDOSCOPY;  Service: Gastroenterology;  Laterality: N/A;    EGD, WITH CLOSED BIOPSY N/A 6/19/2024    Procedure: EGD, WITH CLOSED BIOPSY;  Surgeon: Sujata Kong MD;  Location: [facility] ENDOSCOPY;  Service: Gastroenterology;   Laterality: N/A;    ESOPHAGOGASTRODUODENOSCOPY  02/22/2022    with biopsy and bleeder control    ESOPHAGOGASTRODUODENOSCOPY  03/28/2022    wtih banding    ESOPHAGOGASTRODUODENOSCOPY N/A 06/06/2022    Procedure: EGD (ESOPHAGOGASTRODUODENOSCOPY);  Surgeon: Sujata Kong MD;  Location: [facility] ENDOSCOPY;  Service: Gastroenterology;  Laterality: N/A;    ESOPHAGUS SURGERY  FEBRUARY 2022    [facility]    LIVER BIOPSY  2012    Stage IV cirrhosis    LIVER SURGERY  2012    Biopsy    UPPER GASTROINTESTINAL ENDOSCOPY  Feb 2022    [facility]       Family History   Problem Relation Name Age of Onset    Cancer Mother      Cancer Brother         Social History     Tobacco Use    Smoking status: Never    Smokeless tobacco: Never    Tobacco comments:     Never smoked   Substance Use Topics    Alcohol use: Never     Comment: Not since 1999    Drug use: Yes     Types: Marijuana     Comment: Prescription cannabis       Current Outpatient Medications   Medication Sig Dispense Refill    ALPRAZolam (XANAX) 0.5 MG tablet Take 0.5 mg by mouth 2 (two) times daily as needed.      apixaban (ELIQUIS) 5 mg Tab Take 1 tablet (5 mg total) by mouth 2 (two) times daily. 60 tablet 5    carvediloL (COREG) 6.25 MG tablet Take 1 tablet (6.25 mg total) by mouth 2 (two) times daily. 60 tablet 11    diphenhydrAMINE (BENADRYL) 25 mg capsule Take 25 mg by mouth nightly as needed for Insomnia.      metFORMIN (GLUCOPHAGE) 1000 MG tablet Take 1,000 mg by mouth 2 (two) times daily.       No current facility-administered medications for this visit.       Review of patient's allergies indicates:  No Known Allergies    Review of Systems   Constitutional:  Negative for fever and weight loss.   Cardiovascular:  Negative for leg swelling.   Gastrointestinal:  Negative for abdominal pain, blood in stool, constipation, diarrhea, heartburn, melena, nausea and vomiting.       Vitals:    09/09/24 0747   BP: 139/81   Pulse: 62   Resp: 20   SpO2: 99%   Weight: 80.5 kg  "(177 lb 8 oz)   Height: 5' 9" (1.753 m)       Physical Exam  Constitutional:       General: He is not in acute distress.  Eyes:      General: No scleral icterus.  Cardiovascular:      Rate and Rhythm: Normal rate and regular rhythm.   Pulmonary:      Effort: Pulmonary effort is normal. No respiratory distress.   Abdominal:      General: Bowel sounds are normal. There is no distension.      Palpations: Abdomen is soft.      Tenderness: There is no abdominal tenderness. There is no guarding or rebound.   Musculoskeletal:      Right lower leg: No edema.      Left lower leg: No edema.   Skin:     Coloration: Skin is not jaundiced.       LABS: I personally reviewed pertinent laboratory findings.    Lab Results   Component Value Date    WBC 2.91 (L) 05/21/2024    HGB 14.6 05/21/2024    HCT 42.3 05/21/2024    MCV 80.3 05/21/2024    PLT 91 (L) 05/21/2024       Lab Results   Component Value Date     05/21/2024    K 3.8 05/21/2024     05/21/2024    CO2 22 05/21/2024    BUN 12.4 05/21/2024    CREATININE 0.83 05/21/2024    CALCIUM 9.9 05/21/2024    EGFRNONAA 96 02/23/2022       Lab Results   Component Value Date    ALT 28 05/21/2024    AST 27 05/21/2024    ALKPHOS 68 05/21/2024    BILITOT 1.2 05/21/2024       No results found for: "HAV", "HEPAIGM", "HEPBIGM", "HEPBCAB", "HBEAG", "HEPCAB"    No results found for: "TORY", "MITOAB", "SMOOTHMUSCAB", "IGG", "CERULOPLSM"     MELD 3.0: 10 at 5/21/2024  3:29 PM  MELD-Na: 11 at 5/21/2024  3:29 PM  Calculated from:  Serum Creatinine: 0.83 mg/dL (Using min of 1 mg/dL) at 5/21/2024  3:29 PM  Serum Sodium: 137 mmol/L at 5/21/2024  3:29 PM  Total Bilirubin: 1.2 mg/dL at 5/21/2024  3:29 PM  Serum Albumin: 4.2 g/dL (Using max of 3.5 g/dL) at 5/21/2024  3:29 PM  INR(ratio): 1.4 at 5/21/2024  3:29 PM  Age at listing (hypothetical): 58 years  Sex: Male at 5/21/2024  3:29 PM       IMAGING: I personally reviewed imaging studies.      Assessment:  58 y.o. male with hepatitis C related " cirrhosis. MELD-Na 11. He is decompensated with history of variceal bleeding.    1. Compensated HCV cirrhosis    2. History of esophageal varices with bleeding    3. Portal vein thrombosis        Recommendations:  Cirrhosis due to hepatitis C: He has completed treatment with SVR.  Complete serologic evaluation to help rule out other etiologies.    History of variceal bleeding:  EGD 06/2024 showed grade 1 varices.  Continue surveillance EGDs with Dr. Kong.  Continue carvedilol for secondary prophylaxis.    Portal vein thrombosis:  Would continue Eliquis given extent of thrombosis when diagnosed with extension into splenic vein and SMV.  Suspect this contributed to his variceal bleeding.    Ascites/Edema: Not an active issue    Encephalopathy: Not an active issue    Transplant candidacy: Transplant evaluation not warranted given low MELD.    Cirrhosis HM  - HCC screening: US in 05/2024 without HCC. AFP <2.0. Repeat abdominal US and AFP every 6 months.    - Immunizations: Recommend HAV and HBV vaccinations if not immune.    Labs and US in 2 months. If ok, return to clinic in 8 months with repeat labs and US.    I have sent communication to the referring physician and/or primary care provider.    I spent a total of 60 minutes on the day of the visit. This includes face to face time and non-face to face time preparing to see the patient (eg, review of tests), obtaining and/or reviewing separately obtained history, documenting clinical information in the electronic or other health record, independently interpreting results, and communicating results to the patient/family/caregiver, or care coordination.    This note includes dictation done using M*Modal speech recognition program. Word recognition mistakes are occasionally missed on review.    Kulwant Reed MD  Staff Physician  Hepatology and Liver Transplant  [facility]  [facility]

## 2024-09-12 ENCOUNTER — TELEPHONE (OUTPATIENT)
Dept: HEPATOLOGY | Facility: CLINIC | Age: 59
End: 2024-09-12

## 2024-09-12 NOTE — TELEPHONE ENCOUNTER
Appts scheduled   ----- Message from Kulwant Reed MD sent at 9/11/2024  2:47 PM CDT -----  Labs and ultrasound 2 months.  Return 8 months with repeat labs and ultrasound.

## 2024-09-22 DIAGNOSIS — I81 PORTAL VEIN THROMBOSIS: ICD-10-CM

## 2024-10-24 DIAGNOSIS — I81 PORTAL VEIN THROMBOSIS: ICD-10-CM

## 2024-11-11 ENCOUNTER — HOSPITAL ENCOUNTER (OUTPATIENT)
Dept: RADIOLOGY | Facility: HOSPITAL | Age: 59
Discharge: HOME | End: 2024-11-11
Attending: STUDENT IN AN ORGANIZED HEALTH CARE EDUCATION/TRAINING PROGRAM
Payer: COMMERCIAL

## 2024-11-11 DIAGNOSIS — B19.20 COMPENSATED HCV CIRRHOSIS: ICD-10-CM

## 2024-11-11 DIAGNOSIS — K74.69 COMPENSATED HCV CIRRHOSIS: ICD-10-CM

## 2024-11-11 DIAGNOSIS — Z87.19 HISTORY OF ESOPHAGEAL VARICES WITH BLEEDING: ICD-10-CM

## 2024-11-11 PROCEDURE — 76705 ECHO EXAM OF ABDOMEN: CPT | Mod: TC

## 2024-12-17 ENCOUNTER — OFFICE VISIT (OUTPATIENT)
Dept: GASTROENTEROLOGY | Facility: CLINIC | Age: 59
End: 2024-12-17
Payer: COMMERCIAL

## 2024-12-17 VITALS
WEIGHT: 180 LBS | BODY MASS INDEX: 26.66 KG/M2 | DIASTOLIC BLOOD PRESSURE: 78 MMHG | RESPIRATION RATE: 16 BRPM | HEART RATE: 72 BPM | OXYGEN SATURATION: 95 % | HEIGHT: 69 IN | SYSTOLIC BLOOD PRESSURE: 126 MMHG | TEMPERATURE: 98 F

## 2024-12-17 DIAGNOSIS — I85.10 SECONDARY ESOPHAGEAL VARICES WITHOUT BLEEDING: ICD-10-CM

## 2024-12-17 DIAGNOSIS — I81 PORTAL VEIN THROMBOSIS: ICD-10-CM

## 2024-12-17 DIAGNOSIS — B19.20 COMPENSATED HCV CIRRHOSIS: Primary | ICD-10-CM

## 2024-12-17 DIAGNOSIS — K74.69 COMPENSATED HCV CIRRHOSIS: Primary | ICD-10-CM

## 2024-12-17 PROCEDURE — 99214 OFFICE O/P EST MOD 30 MIN: CPT | Mod: PBBFAC | Performed by: INTERNAL MEDICINE

## 2024-12-17 PROCEDURE — 90739 HEPB VACC 2/4 DOSE ADULT IM: CPT | Mod: PBBFAC

## 2024-12-17 PROCEDURE — 90471 IMMUNIZATION ADMIN: CPT | Mod: PBBFAC

## 2024-12-17 RX ORDER — AZITHROMYCIN 250 MG/1
250 TABLET, FILM COATED ORAL
COMMUNITY
Start: 2024-12-10

## 2024-12-17 RX ORDER — PREDNISONE 20 MG/1
20 TABLET ORAL
COMMUNITY
Start: 2024-12-10

## 2024-12-17 RX ADMIN — HEPATITIS B VACCINE (RECOMBINANT) ADJUVANTED 0.5 ML: 20 INJECTION, SOLUTION INTRAMUSCULAR at 01:12

## 2024-12-17 NOTE — ASSESSMENT & PLAN NOTE
Ascites: none.  Low sodium diet  HE: none  EV: Bleeding in 2022.  S/p several bandings, last July 2023.  EGD June 2024 small varices (not banded - did not stop Eliquis).  Continue carvedilol 6.25 mg BID.     HCC: AFP and q6 imaging        SCREENING:     Last EGD:   June 2024:  three columns of grade I, small varices - not banded as remained on Eliquis.  Moderate PHG  Repeat 1 year    Last imaging study:    CT a/p May 2024 - mixing artifact in portal vein, unchanged nonocclusive thrombus in PV.  No liver lesion  Abd US: Nov 2024: steatosis, no liver lesion.           CIRRHOSIS COUNSELING:     - strict abstinence of alcohol use  - low sodium (salt) 2000mg per day  - Nutrition: 25-30kcal (calorie per body weight in kilogram) per day  - No need to restrict protein in diet  - High protein diet: 1.2-1.5 gram/kg (protein per body weight in kg) per day to prevent muscle mass loss  - Resistance exercises for muscle strength  - Avoid raw seafoods due to risk of fatal Vibrio vulnificus infection  - Avoid Non-steroidal anti-inflammatory drugs (NSAIDs) such as ibuprofen, Motrin, naproxen, Aleve due to risk of kidney damage  - Can take acetaminophen (tylenol), no more than 2000mg per day  - Compliance with all medications  - Ultrasound or MRI of the liver every 6 months for liver cancer screening  - Upper endoscopy every 1 year for varices

## 2024-12-17 NOTE — ASSESSMENT & PLAN NOTE
Diagnosed in July 2023.   Extensive thrombosis involving the portal vein, splenic vein, and SMV  No HCC on triple phase  Started on therapeutic lovenox with transition to Eliquis  Dec 2023 with improving thrombosis with residual clot at the portal confluence still involving the PV, SV, and SMV  CT a/p May 2024 - mixing artifact in portal vein, unchanged nonocclusive thrombus in PV  Continue Eliquis 5 mg BID

## 2024-12-17 NOTE — PROGRESS NOTES
Cirrhosis       Established Patient Note         TODAY'S VISIT DATE:  12/17/2024  The patient's last visit with me was on Visit date not found.     PCP: Navdeep Resendiz      Referring MD:   No ref. provider found    History of Present Illness:    Mr. Zimmerman is a 59 year old with HCV cirrhosis complicated by bleeding esophageal varices s/p banding and PVT here for follow-up.    HCV positive treated initially with IFN + ribavirin in the past with relapse and subsequent treatment with Epclusa with SVR.      Patient was admitted to the hospital in February 2022 for severe EDWARD.  Known to have HCV cirrhosis and EGD showed large esophageal varices with stigmata.  He reported a several week history of melena preceding.  He had banding at that time.  He has undergone 4 EV bandings, last in July 2023.  His last EGD in July 2023 showed three columns of small, grade II varices s/p banding x 3, moderate PHG.  He was to [facility]- for repeat banding but relocated for work.      Abd US in July 2023 showed cirrhosis with nonocclusive PVT.  He has a triple phase CT scan in September 2023 in [place] showed extensive portal vein, splenic vein and SMV thormbosis.  I started him on therapeutic lovenox and transitioned to Eliquis.  Repeat CT scan on 12/2023 showed improving thrombosis with some residual clot at the portal confluence.  CT a/p May 2024 - mixing artifact in portal vein, unchanged nonocclusive thrombus in PV.    He was seen by Dr. Reed in the interim.  Recommended continued therapy with current meds and continue anticoagulation in setting of PVT and clot burden.     He is doing well.  He is having regular stools daily, formed stools without any rectal bleeding or melena.  He is following a low sodium diet. He denies any swelling, abdominal distention, itching, leg swelling, confusion. He remains on carvedilol 6.25 mg BID.  BP is good. Weight is stable.       He denies any tobacco use,  alcohol use.  He denies any FH of liver disease, colon polyps, GI cancers.  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       Background:  Alcohol: none  IVDU: remote in the past  Cocaine: nasal in past  Tattoos: none    Tobacco: never smoker    Liver disease: HCV s/p SVR    MELD-Na: 11  Child-Chaudhry Class: A    Transplant: not under evaluation - low MELD    Cirrhosis is decompensated with:    Ascites:     No   Spontaneous bacterial peritonitis:  No  Hepatic Encephalopathy:   No  Hepatocellular carcinoma:   No  Hepatorenal syndrome:   No  Hyponatremia:    No  Muscle wasting:    No  Portal vein thrombosis:   Yes        Medical/Surgical History:   Past Medical History:   Diagnosis Date    Acute pancreatitis 2018    [facility], [place]    Anemia 2012    Cirrhosis 2012    Diabetes mellitus     Portal vein thrombosis     Unspecified viral hepatitis C without hepatic coma      Past Surgical History:   Procedure Laterality Date    CHOLECYSTECTOMY      COLONOSCOPY  2018    EGD, WITH BANDING OF VARICES N/A 05/02/2022    Procedure: EGD, WITH BANDING OF VARICES;  Surgeon: Sujata Kong MD;  Location: [facility] ENDOSCOPY;  Service: Gastroenterology;  Laterality: N/A;    EGD, WITH BANDING OF VARICES N/A 07/05/2023    Procedure: EGD, WITH BANDING OF VARICES;  Surgeon: Sujata Kong MD;  Location: [facility] ENDOSCOPY;  Service: Gastroenterology;  Laterality: N/A;    EGD, WITH CLOSED BIOPSY N/A 6/19/2024    Procedure: EGD, WITH CLOSED BIOPSY;  Surgeon: Sujata Kong MD;  Location: [facility] ENDOSCOPY;  Service: Gastroenterology;  Laterality: N/A;    ESOPHAGOGASTRODUODENOSCOPY  02/22/2022    with biopsy and bleeder control    ESOPHAGOGASTRODUODENOSCOPY  03/28/2022    wtih banding    ESOPHAGOGASTRODUODENOSCOPY N/A 06/06/2022    Procedure: EGD (ESOPHAGOGASTRODUODENOSCOPY);  Surgeon: Sujata Kong MD;  Location: [facility] ENDOSCOPY;  Service: Gastroenterology;  Laterality: N/A;    ESOPHAGUS SURGERY  FEBRUARY 2022     [facility]    LIVER BIOPSY  2012    Stage IV cirrhosis    LIVER SURGERY  2012    Biopsy    UPPER GASTROINTESTINAL ENDOSCOPY  Feb 2022    [facility]         Family History:   Family History   Problem Relation Name Age of Onset    Cancer Mother      Cancer Brother          Social History:   Social History     Socioeconomic History    Marital status:    Tobacco Use    Smoking status: Never    Smokeless tobacco: Never    Tobacco comments:     Never smoked   Substance and Sexual Activity    Alcohol use: Never     Comment: Not since 1999    Drug use: Yes     Types: Marijuana     Comment: Prescription cannabis    Sexual activity: Not Currently     Partners: Female     Birth control/protection: None        Review of patient's allergies indicates:  No Known Allergies    Current Medications:   Outpatient Medications Marked as Taking for the 12/17/24 encounter (Office Visit) with Sujata Kong MD   Medication Sig Dispense Refill    ALPRAZolam (XANAX) 0.5 MG tablet Take 0.5 mg by mouth 2 (two) times daily as needed.      apixaban (ELIQUIS) 5 mg Tab Take 1 tablet (5 mg total) by mouth 2 (two) times daily. 60 tablet 5    azithromycin (Z-ROSA) 250 MG tablet Take 250 mg by mouth.      carvediloL (COREG) 6.25 MG tablet Take 1 tablet (6.25 mg total) by mouth 2 (two) times daily. 60 tablet 11    diphenhydrAMINE (BENADRYL) 25 mg capsule Take 25 mg by mouth nightly as needed for Insomnia.      metFORMIN (GLUCOPHAGE) 1000 MG tablet Take 1,000 mg by mouth 2 (two) times daily.      predniSONE (DELTASONE) 20 MG tablet Take 20 mg by mouth.          Review of Systems   Constitutional:  Negative for appetite change and unexpected weight change.   HENT:  Negative for trouble swallowing.    Respiratory: Negative.     Cardiovascular: Negative.    Gastrointestinal:  Positive for nausea. Negative for abdominal pain, blood in stool, change in bowel habit, constipation and diarrhea.   Musculoskeletal: Negative.    Neurological:  "Negative.    Hematological: Negative.    Psychiatric/Behavioral: Negative.     All other systems reviewed and are negative.      Vital Signs:  /78 (BP Location: Left arm, Patient Position: Sitting)   Pulse 72   Temp 98 °F (36.7 °C) (Oral)   Resp 16   Ht 5' 9" (1.753 m)   Wt 81.6 kg (180 lb)   SpO2 95%   BMI 26.58 kg/m²      Physical Exam  Vitals reviewed.   Constitutional:       Appearance: Normal appearance.   HENT:      Head: Normocephalic and atraumatic.      Mouth/Throat:      Mouth: Mucous membranes are moist.   Eyes:      Extraocular Movements: Extraocular movements intact.      Conjunctiva/sclera: Conjunctivae normal.   Cardiovascular:      Rate and Rhythm: Normal rate and regular rhythm.   Pulmonary:      Effort: Pulmonary effort is normal.      Breath sounds: Normal breath sounds.   Abdominal:      General: Bowel sounds are normal.      Palpations: Abdomen is soft.      Tenderness: There is no abdominal tenderness.   Musculoskeletal:      Cervical back: Normal range of motion.   Skin:     General: Skin is warm and dry.   Neurological:      General: No focal deficit present.      Mental Status: He is alert and oriented to person, place, and time.   Psychiatric:         Mood and Affect: Mood normal.         Behavior: Behavior normal.         Labs: Reviewed      WBC   Date Value Ref Range Status   11/11/2024 3.44 (L) 4.00 - 11.50 x10(3)/mcL Final     Hgb   Date Value Ref Range Status   11/11/2024 14.6 13.0 - 18.0 g/dL Final     Hct   Date Value Ref Range Status   11/11/2024 43.0 36.0 - 52.0 % Final     Platelet   Date Value Ref Range Status   11/11/2024 92 (L) 140 - 371 x10(3)/mcL Final     MCV   Date Value Ref Range Status   11/11/2024 80.4 79.0 - 99.0 fL Final       Sodium   Date Value Ref Range Status   11/11/2024 137 136 - 145 mmol/L Final     Potassium   Date Value Ref Range Status   11/11/2024 4.0 3.5 - 5.1 mmol/L Final     CO2   Date Value Ref Range Status   11/11/2024 24 21 - 32 mmol/L " Final     Blood Urea Nitrogen   Date Value Ref Range Status   11/11/2024 13 7.0 - 20.0 mg/dL Final     Creatinine   Date Value Ref Range Status   11/11/2024 0.68 0.66 - 1.25 mg/dL Final     Bilirubin Total   Date Value Ref Range Status   11/11/2024 1.9 (H) 0.0 - 1.0 mg/dL Final     Bilirubin Direct   Date Value Ref Range Status   02/23/2022 0.5 0.0 - 0.5      Bilirubin Indirect   Date Value Ref Range Status   02/23/2022 0.70 0.00 - 0.80      ALP   Date Value Ref Range Status   11/11/2024 66 50 - 144 unit/L Final     AST   Date Value Ref Range Status   11/11/2024 37 17 - 59 unit/L Final     ALT   Date Value Ref Range Status   11/11/2024 31 1 - 45 unit/L Final     Protein Total   Date Value Ref Range Status   11/11/2024 8.2 6.3 - 8.2 gm/dL Final     PT   Date Value Ref Range Status   11/11/2024 13.3 (H) 9.3 - 11.9 seconds Final     Albumin   Date Value Ref Range Status   11/11/2024 4.9 3.4 - 5.0 g/dL Final       INR   Date Value Ref Range Status   11/11/2024 1.3 <5.0 Final       Alpha Fetoprotein Level   Date Value Ref Range Status   11/11/2024 <2.00 <=8.90 ng/mL Final            Assessment/Plan:    Problem List Items Addressed This Visit       Esophageal varices without bleeding     EV bleeding in Feb 2022  S/p multiple bandings  Last EGD June 2024: three columns of grade I, small varices   Continue carvedilol 6.25mg bid  Repeat EGD in 1 year         Compensated HCV cirrhosis - Primary     Ascites: none.  Low sodium diet  HE: none  EV: Bleeding in 2022.  S/p several bandings, last July 2023.  EGD June 2024 small varices (not banded - did not stop Eliquis).  Continue carvedilol 6.25 mg BID.     HCC: AFP and q6 imaging        SCREENING:     Last EGD:   June 2024:  three columns of grade I, small varices - not banded as remained on Eliquis.  Moderate PHG  Repeat 1 year    Last imaging study:    CT a/p May 2024 - mixing artifact in portal vein, unchanged nonocclusive thrombus in PV.  No liver lesion  Abd US: Nov 2024:  steatosis, no liver lesion.           CIRRHOSIS COUNSELING:     - strict abstinence of alcohol use  - low sodium (salt) 2000mg per day  - Nutrition: 25-30kcal (calorie per body weight in kilogram) per day  - No need to restrict protein in diet  - High protein diet: 1.2-1.5 gram/kg (protein per body weight in kg) per day to prevent muscle mass loss  - Resistance exercises for muscle strength  - Avoid raw seafoods due to risk of fatal Vibrio vulnificus infection  - Avoid Non-steroidal anti-inflammatory drugs (NSAIDs) such as ibuprofen, Motrin, naproxen, Aleve due to risk of kidney damage  - Can take acetaminophen (tylenol), no more than 2000mg per day  - Compliance with all medications  - Ultrasound or MRI of the liver every 6 months for liver cancer screening  - Upper endoscopy every 1 year for varices         Relevant Medications    hepatitis B (HEPLISAV-B) 20 mcg/0.5 mL vaccine 0.5 mL (Completed)    Portal vein thrombosis     Diagnosed in July 2023.   Extensive thrombosis involving the portal vein, splenic vein, and SMV  No HCC on triple phase  Started on therapeutic lovenox with transition to Eliquis  Dec 2023 with improving thrombosis with residual clot at the portal confluence still involving the PV, SV, and SMV  CT a/p May 2024 - mixing artifact in portal vein, unchanged nonocclusive thrombus in PV  Continue Eliquis 5 mg BID                  HEALTH MAINTENANCE:     Vaccinations:    Influenza (inactive):  recommended annually   PCV 20 (Prevnar): recommended  Tetanus (TdaP): recommended every 10 years  HPV (males and females ages 11-44 yo): N/A  Meningococcal:  No risk factors   Hepatitis B: Heplisav # 1     Hepatitis A:  immune     MMR (live vaccine): UTD          Chickenpox status/Varicella (live vaccine):  + Ab    Shingrix: recommended   COVID-19: recommend      Colon cancer risk:    Colonoscopy:  Last colonoscopy was several years ago in [place].  Does not remember MD or facility.  Was told was fine   Add  colonoscopy to EGD in June 2025.       DEXA scan: recommend      Follow up: 6 months

## 2024-12-17 NOTE — ASSESSMENT & PLAN NOTE
EV bleeding in Feb 2022  S/p multiple bandings  Last EGD June 2024: three columns of grade I, small varices   Continue carvedilol 6.25mg bid  Repeat EGD in 1 year

## 2025-01-23 DIAGNOSIS — I81 PORTAL VEIN THROMBOSIS: Primary | ICD-10-CM

## 2025-01-29 DIAGNOSIS — I85.10 SECONDARY ESOPHAGEAL VARICES WITHOUT BLEEDING: ICD-10-CM

## 2025-02-05 RX ORDER — CARVEDILOL 6.25 MG/1
6.25 TABLET ORAL 2 TIMES DAILY
Qty: 60 TABLET | Refills: 11 | Status: SHIPPED | OUTPATIENT
Start: 2025-02-05 | End: 2026-02-05

## 2025-05-19 DIAGNOSIS — I81 PORTAL VEIN THROMBOSIS: ICD-10-CM

## 2025-06-30 DIAGNOSIS — I81 PORTAL VEIN THROMBOSIS: ICD-10-CM

## (undated) DEVICE — MOUTHPIECE ENDO 60FR

## (undated) DEVICE — TIP SUCTION YANKAUER

## (undated) DEVICE — MANIFOLD 4 PORT

## (undated) DEVICE — KIT LIGATOR MUTI-BAND SAEED

## (undated) DEVICE — KIT SURGICAL COLON .25 1.1OZ

## (undated) DEVICE — SOL IRRI STRL WATER 1000ML

## (undated) DEVICE — FORCEP ALLIGATOR 2.8MM W/NDL

## (undated) DEVICE — LIDOCAINE HCI VISCOUS SOL 2%